# Patient Record
Sex: FEMALE | Race: WHITE | NOT HISPANIC OR LATINO | Employment: STUDENT | URBAN - METROPOLITAN AREA
[De-identification: names, ages, dates, MRNs, and addresses within clinical notes are randomized per-mention and may not be internally consistent; named-entity substitution may affect disease eponyms.]

---

## 2017-03-19 ENCOUNTER — HOSPITAL ENCOUNTER (EMERGENCY)
Facility: HOSPITAL | Age: 22
Discharge: HOME | End: 2017-03-20
Attending: EMERGENCY MEDICINE | Admitting: EMERGENCY MEDICINE

## 2017-03-19 DIAGNOSIS — T80.90XA INJECTION SITE REACTION, INITIAL ENCOUNTER: Primary | ICD-10-CM

## 2017-03-19 PROCEDURE — 99283 EMERGENCY DEPT VISIT LOW MDM: CPT

## 2017-03-20 VITALS
WEIGHT: 190 LBS | BODY MASS INDEX: 32.44 KG/M2 | HEIGHT: 64 IN | TEMPERATURE: 98.7 F | RESPIRATION RATE: 18 BRPM | OXYGEN SATURATION: 99 % | HEART RATE: 89 BPM | SYSTOLIC BLOOD PRESSURE: 127 MMHG | DIASTOLIC BLOOD PRESSURE: 68 MMHG

## 2017-03-20 PROCEDURE — 63710000001 DIPHENHYDRAMINE PER 50 MG: Performed by: EMERGENCY MEDICINE

## 2017-03-20 RX ORDER — DIPHENHYDRAMINE HCL 25 MG
25 CAPSULE ORAL ONCE
Status: COMPLETED | OUTPATIENT
Start: 2017-03-20 | End: 2017-03-20

## 2017-03-20 RX ADMIN — DIPHENHYDRAMINE HYDROCHLORIDE 25 MG: 25 CAPSULE ORAL at 00:30

## 2017-03-20 NOTE — ED PROVIDER NOTES
Subjective   HPI Comments: Mrs. Tinoco is a 21 y.o female who presents to the ED c/o RUE pain starting 6 days ago. Pt reports that she had a Hepatitis B and PNA 23 shot in the right arm 6 days ago. At that time she reports of worsening swelling, itching, and burning. Starting a day later she could not lift that arm and complained of muscle fatigue in that arm. She has tried She denies any fever, or other acute sx, injuries, or pain at this time.       Back normal     Patient is a 21 y.o. female presenting with upper extremity pain.   History provided by:  Patient  Upper Extremity Issue   Location:  Arm  Arm location:  R arm  Injury: no    Pain details:     Onset quality:  Sudden    Duration:  6 days    Timing:  Constant    Progression:  Worsening  Dislocation: no    Foreign body present:  No foreign bodies  Tetanus status:  Unknown  Prior injury to area:  Unable to specify  Relieved by:  None tried  Worsened by:  Nothing  Ineffective treatments:  None tried  Associated symptoms: decreased range of motion, fatigue, muscle weakness and swelling    Associated symptoms: no back pain and no neck pain        Review of Systems   Constitutional: Positive for fatigue.   HENT: Negative.    Respiratory: Negative.    Cardiovascular: Negative for chest pain.   Gastrointestinal: Negative for abdominal pain.   Musculoskeletal: Positive for joint swelling. Negative for back pain and neck pain.   Skin: Positive for rash.   All other systems reviewed and are negative.      Past Medical History   Diagnosis Date   • Muckle-Wells syndrome        No Known Allergies    Past Surgical History   Procedure Laterality Date   • Tonsillectomy     • Sinus surgery     • Cholecystectomy     • Adenoidectomy         History reviewed. No pertinent family history.    Social History     Social History   • Marital status: Single     Spouse name: N/A   • Number of children: N/A   • Years of education: N/A     Social History Main Topics   • Smoking status:  Never Smoker   • Smokeless tobacco: None   • Alcohol use No   • Drug use: No   • Sexual activity: Not Asked     Other Topics Concern   • None     Social History Narrative         Objective   Physical Exam   Constitutional: She is oriented to person, place, and time. She appears well-developed and well-nourished. No distress.   HENT:   Head: Normocephalic and atraumatic.   Eyes: Conjunctivae are normal.   Neck: Normal range of motion. Neck supple.   Cardiovascular: Intact distal pulses.    Pulses:       Radial pulses are 2+ on the right side, and 2+ on the left side.   Pulmonary/Chest: Effort normal and breath sounds normal. No respiratory distress.   Abdominal: Soft. There is no tenderness.   Musculoskeletal: Normal range of motion.   Neurological: She is alert and oriented to person, place, and time. She has normal strength.   Skin: Skin is warm and dry. Rash noted.   Erythematous patch on right upper extremity on the lateral aspect measuring 15 cm vertically by 7-10 cm horizontal and irregular. Warm to touch, no lymphangitic streaking. No axillary lymphadenotomy.   Psychiatric: She has a normal mood and affect. Her behavior is normal.   Nursing note and vitals reviewed.      Procedures         ED Course  ED Course   Comment By Time   Spoke with Will, pharmacist, who reports that when two immunizations given in the same arm these reactions can occur and persist for a week to ten days. Recommends management with ibuprofen. Neither is a live vaccine. -MS Josr Kruger 03/19 2310     No results found for this or any previous visit (from the past 24 hour(s)).  Note: In addition to lab results from this visit, the labs listed above may include labs taken at another facility or during a different encounter within the last 24 hours. Please correlate lab times with ED admission and discharge times for further clarification of the services performed during this visit.    No orders to display     Vitals:    03/19/17 2211  "03/20/17 0040   BP: 128/70 127/68   BP Location: Left arm    Patient Position: Sitting    Pulse: 112 89   Resp: 16 18   Temp: 98.7 °F (37.1 °C)    TempSrc: Oral    SpO2: 98% 99%   Weight: 190 lb (86.2 kg)    Height: 64\" (162.6 cm)      Medications   diphenhydrAMINE (BENADRYL) capsule 25 mg (25 mg Oral Given 3/20/17 0030)     ECG/EMG Results (last 24 hours)     ** No results found for the last 24 hours. **                          MDM  Number of Diagnoses or Management Options  Injection site reaction, initial encounter:   Diagnosis management comments: Consider med rxn, allergic rxn, infxn.        Final diagnoses:   Injection site reaction, initial encounter       Documentation assistance provided by analilia PATRICK.  Information recorded by the scribe was done at my direction and has been verified and validated by me.     Josr Patrick  03/19/17 [id number]       Jet Fried MD  03/20/17 8607    "

## 2017-03-20 NOTE — DISCHARGE INSTRUCTIONS
Take benadryl and ibuprofen as needed for pain, swelling.     Follow up with your primary medical doctor next available appointment.     Immediately return to the emergency department for any new or worsening symptoms, or if your redness spreads significantly.    In future request that immunizations be given at different sites.    Our pharmacist suggest that the reaction should begin resolving in the next few days.

## 2017-10-25 ENCOUNTER — LAB REQUISITION (OUTPATIENT)
Dept: LAB | Facility: HOSPITAL | Age: 22
End: 2017-10-25

## 2017-10-25 DIAGNOSIS — Z00.00 ROUTINE GENERAL MEDICAL EXAMINATION AT A HEALTH CARE FACILITY: ICD-10-CM

## 2017-10-25 PROCEDURE — 36415 COLL VENOUS BLD VENIPUNCTURE: CPT | Performed by: PHYSICIAN ASSISTANT

## 2017-12-11 ENCOUNTER — LAB REQUISITION (OUTPATIENT)
Dept: LAB | Facility: HOSPITAL | Age: 22
End: 2017-12-11

## 2017-12-11 DIAGNOSIS — Z00.00 ROUTINE GENERAL MEDICAL EXAMINATION AT A HEALTH CARE FACILITY: ICD-10-CM

## 2017-12-11 PROCEDURE — 36415 COLL VENOUS BLD VENIPUNCTURE: CPT

## 2018-06-20 ENCOUNTER — LAB REQUISITION (OUTPATIENT)
Dept: LAB | Facility: HOSPITAL | Age: 23
End: 2018-06-20

## 2018-06-20 DIAGNOSIS — Z00.00 ROUTINE GENERAL MEDICAL EXAMINATION AT A HEALTH CARE FACILITY: ICD-10-CM

## 2018-06-20 PROCEDURE — 36415 COLL VENOUS BLD VENIPUNCTURE: CPT

## 2019-07-05 ENCOUNTER — LAB REQUISITION (OUTPATIENT)
Dept: LAB | Facility: HOSPITAL | Age: 24
End: 2019-07-05

## 2019-07-05 DIAGNOSIS — E78.5 HYPERLIPIDEMIA: ICD-10-CM

## 2019-07-05 DIAGNOSIS — R53.82 CHRONIC FATIGUE: ICD-10-CM

## 2019-07-05 DIAGNOSIS — M04.1 PERIODIC FEVER (HCC): ICD-10-CM

## 2019-07-05 LAB
25(OH)D3 SERPL-MCNC: 24.9 NG/ML (ref 30–100)
ALBUMIN SERPL-MCNC: 4.4 G/DL (ref 3.5–5.2)
ALBUMIN SERPL-MCNC: 4.4 G/DL (ref 3.5–5.2)
ALBUMIN/GLOB SERPL: 1.6 G/DL
ALBUMIN/GLOB SERPL: 1.7 G/DL
ALP SERPL-CCNC: 73 U/L (ref 39–117)
ALP SERPL-CCNC: 73 U/L (ref 39–117)
ALT SERPL W P-5'-P-CCNC: 28 U/L (ref 1–33)
ALT SERPL W P-5'-P-CCNC: 28 U/L (ref 1–33)
ANION GAP SERPL CALCULATED.3IONS-SCNC: 13 MMOL/L (ref 5–15)
ANION GAP SERPL CALCULATED.3IONS-SCNC: 14 MMOL/L (ref 5–15)
AST SERPL-CCNC: 29 U/L (ref 1–32)
AST SERPL-CCNC: 31 U/L (ref 1–32)
BASOPHILS # BLD AUTO: 0.03 10*3/MM3 (ref 0–0.2)
BASOPHILS NFR BLD AUTO: 0.8 % (ref 0–1.5)
BILIRUB SERPL-MCNC: 0.5 MG/DL (ref 0.2–1.2)
BILIRUB SERPL-MCNC: 0.5 MG/DL (ref 0.2–1.2)
BUN BLD-MCNC: 8 MG/DL (ref 6–20)
BUN BLD-MCNC: 8 MG/DL (ref 6–20)
BUN/CREAT SERPL: 13.3 (ref 7–25)
BUN/CREAT SERPL: 14.3 (ref 7–25)
CALCIUM SPEC-SCNC: 9.2 MG/DL (ref 8.6–10.5)
CALCIUM SPEC-SCNC: 9.3 MG/DL (ref 8.6–10.5)
CHLORIDE SERPL-SCNC: 102 MMOL/L (ref 98–107)
CHLORIDE SERPL-SCNC: 103 MMOL/L (ref 98–107)
CO2 SERPL-SCNC: 23 MMOL/L (ref 22–29)
CO2 SERPL-SCNC: 23 MMOL/L (ref 22–29)
CREAT BLD-MCNC: 0.56 MG/DL (ref 0.57–1)
CREAT BLD-MCNC: 0.6 MG/DL (ref 0.57–1)
CRP SERPL-MCNC: 0.45 MG/DL (ref 0–0.5)
DEPRECATED RDW RBC AUTO: 37 FL (ref 37–54)
DEPRECATED RDW RBC AUTO: 39.8 FL (ref 37–54)
EOSINOPHIL # BLD AUTO: 0.06 10*3/MM3 (ref 0–0.4)
EOSINOPHIL NFR BLD AUTO: 1.6 % (ref 0.3–6.2)
ERYTHROCYTE [DISTWIDTH] IN BLOOD BY AUTOMATED COUNT: 11.8 % (ref 12.3–15.4)
ERYTHROCYTE [DISTWIDTH] IN BLOOD BY AUTOMATED COUNT: 12.3 % (ref 12.3–15.4)
ERYTHROCYTE [SEDIMENTATION RATE] IN BLOOD: 11 MM/HR (ref 0–20)
FERRITIN SERPL-MCNC: 144 NG/ML (ref 13–150)
GFR SERPL CREATININE-BSD FRML MDRD: 124 ML/MIN/1.73
GFR SERPL CREATININE-BSD FRML MDRD: 134 ML/MIN/1.73
GLOBULIN UR ELPH-MCNC: 2.6 GM/DL
GLOBULIN UR ELPH-MCNC: 2.7 GM/DL
GLUCOSE BLD-MCNC: 105 MG/DL (ref 65–99)
GLUCOSE BLD-MCNC: 107 MG/DL (ref 65–99)
HCT VFR BLD AUTO: 41.1 % (ref 34–46.6)
HCT VFR BLD AUTO: 41.6 % (ref 34–46.6)
HGB BLD-MCNC: 13.6 G/DL (ref 12–15.9)
HGB BLD-MCNC: 13.8 G/DL (ref 12–15.9)
IMM GRANULOCYTES # BLD AUTO: 0 10*3/MM3 (ref 0–0.05)
IMM GRANULOCYTES NFR BLD AUTO: 0 % (ref 0–0.5)
LYMPHOCYTES # BLD AUTO: 1.15 10*3/MM3 (ref 0.7–3.1)
LYMPHOCYTES NFR BLD AUTO: 30.8 % (ref 19.6–45.3)
MCH RBC QN AUTO: 28.6 PG (ref 26.6–33)
MCH RBC QN AUTO: 29.3 PG (ref 26.6–33)
MCHC RBC AUTO-ENTMCNC: 33.1 G/DL (ref 31.5–35.7)
MCHC RBC AUTO-ENTMCNC: 33.2 G/DL (ref 31.5–35.7)
MCV RBC AUTO: 86.3 FL (ref 79–97)
MCV RBC AUTO: 88.6 FL (ref 79–97)
MONOCYTES # BLD AUTO: 0.39 10*3/MM3 (ref 0.1–0.9)
MONOCYTES NFR BLD AUTO: 10.5 % (ref 5–12)
NEUTROPHILS # BLD AUTO: 2.1 10*3/MM3 (ref 1.7–7)
NEUTROPHILS NFR BLD AUTO: 56.3 % (ref 42.7–76)
NRBC BLD AUTO-RTO: 0 /100 WBC (ref 0–0.2)
PLATELET # BLD AUTO: 209 10*3/MM3 (ref 140–450)
PLATELET # BLD AUTO: 216 10*3/MM3 (ref 140–450)
PMV BLD AUTO: 11.9 FL (ref 6–12)
PMV BLD AUTO: 12 FL (ref 6–12)
POTASSIUM BLD-SCNC: 3.9 MMOL/L (ref 3.5–5.2)
POTASSIUM BLD-SCNC: 4 MMOL/L (ref 3.5–5.2)
PROT SERPL-MCNC: 7 G/DL (ref 6–8.5)
PROT SERPL-MCNC: 7.1 G/DL (ref 6–8.5)
RBC # BLD AUTO: 4.64 10*6/MM3 (ref 3.77–5.28)
RBC # BLD AUTO: 4.82 10*6/MM3 (ref 3.77–5.28)
SODIUM BLD-SCNC: 139 MMOL/L (ref 136–145)
SODIUM BLD-SCNC: 139 MMOL/L (ref 136–145)
TSH SERPL DL<=0.05 MIU/L-ACNC: 2.62 MIU/ML (ref 0.27–4.2)
VIT B12 BLD-MCNC: 431 PG/ML (ref 211–946)
WBC NRBC COR # BLD: 3.63 10*3/MM3 (ref 3.4–10.8)
WBC NRBC COR # BLD: 3.73 10*3/MM3 (ref 3.4–10.8)

## 2019-07-05 PROCEDURE — 80053 COMPREHEN METABOLIC PANEL: CPT | Performed by: PEDIATRICS

## 2019-07-05 PROCEDURE — 82607 VITAMIN B-12: CPT | Performed by: NURSE PRACTITIONER

## 2019-07-05 PROCEDURE — 36415 COLL VENOUS BLD VENIPUNCTURE: CPT | Performed by: PEDIATRICS

## 2019-07-05 PROCEDURE — 82728 ASSAY OF FERRITIN: CPT | Performed by: PEDIATRICS

## 2019-07-05 PROCEDURE — 85025 COMPLETE CBC W/AUTO DIFF WBC: CPT | Performed by: PEDIATRICS

## 2019-07-05 PROCEDURE — 86140 C-REACTIVE PROTEIN: CPT | Performed by: PEDIATRICS

## 2019-07-05 PROCEDURE — 85652 RBC SED RATE AUTOMATED: CPT | Performed by: PEDIATRICS

## 2019-07-05 PROCEDURE — 82306 VITAMIN D 25 HYDROXY: CPT | Performed by: NURSE PRACTITIONER

## 2019-07-05 PROCEDURE — 85027 COMPLETE CBC AUTOMATED: CPT | Performed by: NURSE PRACTITIONER

## 2019-07-05 PROCEDURE — 80053 COMPREHEN METABOLIC PANEL: CPT | Performed by: NURSE PRACTITIONER

## 2019-07-05 PROCEDURE — 84443 ASSAY THYROID STIM HORMONE: CPT | Performed by: NURSE PRACTITIONER

## 2019-09-08 ENCOUNTER — APPOINTMENT (OUTPATIENT)
Dept: CT IMAGING | Facility: HOSPITAL | Age: 24
End: 2019-09-08

## 2019-09-08 ENCOUNTER — HOSPITAL ENCOUNTER (INPATIENT)
Facility: HOSPITAL | Age: 24
LOS: 2 days | Discharge: HOME | End: 2019-09-10
Attending: EMERGENCY MEDICINE | Admitting: FAMILY MEDICINE

## 2019-09-08 DIAGNOSIS — N13.30 HYDRONEPHROSIS OF LEFT KIDNEY: ICD-10-CM

## 2019-09-08 DIAGNOSIS — R03.0 ELEVATED BLOOD-PRESSURE READING WITHOUT DIAGNOSIS OF HYPERTENSION: ICD-10-CM

## 2019-09-08 DIAGNOSIS — R11.2 INTRACTABLE VOMITING WITH NAUSEA, UNSPECIFIED VOMITING TYPE: ICD-10-CM

## 2019-09-08 DIAGNOSIS — N20.1 LEFT URETERAL STONE: ICD-10-CM

## 2019-09-08 DIAGNOSIS — R52 INTRACTABLE PAIN: Primary | ICD-10-CM

## 2019-09-08 PROBLEM — N20.0 KIDNEY STONES: Status: ACTIVE | Noted: 2019-09-08

## 2019-09-08 LAB
ALBUMIN SERPL-MCNC: 4.2 G/DL (ref 3.5–5.2)
ALBUMIN/GLOB SERPL: 1.4 G/DL
ALP SERPL-CCNC: 78 U/L (ref 39–117)
ALT SERPL W P-5'-P-CCNC: 31 U/L (ref 1–33)
ANION GAP SERPL CALCULATED.3IONS-SCNC: 14 MMOL/L (ref 5–15)
AST SERPL-CCNC: 31 U/L (ref 1–32)
B-HCG UR QL: NEGATIVE
BACTERIA UR QL AUTO: ABNORMAL /HPF
BASOPHILS # BLD AUTO: 0.03 10*3/MM3 (ref 0–0.2)
BASOPHILS NFR BLD AUTO: 0.4 % (ref 0–1.5)
BILIRUB SERPL-MCNC: 0.3 MG/DL (ref 0.2–1.2)
BILIRUB UR QL STRIP: NEGATIVE
BUN BLD-MCNC: 15 MG/DL (ref 6–20)
BUN/CREAT SERPL: 20.5 (ref 7–25)
CALCIUM SPEC-SCNC: 9.1 MG/DL (ref 8.6–10.5)
CHLORIDE SERPL-SCNC: 101 MMOL/L (ref 98–107)
CLARITY UR: CLEAR
CO2 SERPL-SCNC: 24 MMOL/L (ref 22–29)
COLOR UR: YELLOW
CREAT BLD-MCNC: 0.73 MG/DL (ref 0.57–1)
DEPRECATED RDW RBC AUTO: 37.9 FL (ref 37–54)
EOSINOPHIL # BLD AUTO: 0.03 10*3/MM3 (ref 0–0.4)
EOSINOPHIL NFR BLD AUTO: 0.4 % (ref 0.3–6.2)
ERYTHROCYTE [DISTWIDTH] IN BLOOD BY AUTOMATED COUNT: 11.9 % (ref 12.3–15.4)
GFR SERPL CREATININE-BSD FRML MDRD: 98 ML/MIN/1.73
GLOBULIN UR ELPH-MCNC: 3 GM/DL
GLUCOSE BLD-MCNC: 128 MG/DL (ref 65–99)
GLUCOSE UR STRIP-MCNC: NEGATIVE MG/DL
HCT VFR BLD AUTO: 41.7 % (ref 34–46.6)
HGB BLD-MCNC: 13.9 G/DL (ref 12–15.9)
HGB UR QL STRIP.AUTO: ABNORMAL
IMM GRANULOCYTES # BLD AUTO: 0.02 10*3/MM3 (ref 0–0.05)
IMM GRANULOCYTES NFR BLD AUTO: 0.3 % (ref 0–0.5)
INTERNAL NEGATIVE CONTROL: NEGATIVE
INTERNAL POSITIVE CONTROL: POSITIVE
KETONES UR QL STRIP: NEGATIVE
LEUKOCYTE ESTERASE UR QL STRIP.AUTO: NEGATIVE
LYMPHOCYTES # BLD AUTO: 1.17 10*3/MM3 (ref 0.7–3.1)
LYMPHOCYTES NFR BLD AUTO: 15.7 % (ref 19.6–45.3)
Lab: NORMAL
MCH RBC QN AUTO: 29 PG (ref 26.6–33)
MCHC RBC AUTO-ENTMCNC: 33.3 G/DL (ref 31.5–35.7)
MCV RBC AUTO: 87.1 FL (ref 79–97)
MONOCYTES # BLD AUTO: 0.5 10*3/MM3 (ref 0.1–0.9)
MONOCYTES NFR BLD AUTO: 6.7 % (ref 5–12)
NEUTROPHILS # BLD AUTO: 5.72 10*3/MM3 (ref 1.7–7)
NEUTROPHILS NFR BLD AUTO: 76.5 % (ref 42.7–76)
NITRITE UR QL STRIP: NEGATIVE
NRBC BLD AUTO-RTO: 0 /100 WBC (ref 0–0.2)
PH UR STRIP.AUTO: 5.5 [PH] (ref 5–8)
PLATELET # BLD AUTO: 224 10*3/MM3 (ref 140–450)
PMV BLD AUTO: 10.9 FL (ref 6–12)
POTASSIUM BLD-SCNC: 4.3 MMOL/L (ref 3.5–5.2)
PROT SERPL-MCNC: 7.2 G/DL (ref 6–8.5)
PROT UR QL STRIP: NEGATIVE
RBC # BLD AUTO: 4.79 10*6/MM3 (ref 3.77–5.28)
RBC # UR: ABNORMAL /HPF
REF LAB TEST METHOD: ABNORMAL
SODIUM BLD-SCNC: 139 MMOL/L (ref 136–145)
SP GR UR STRIP: 1.02 (ref 1–1.03)
SQUAMOUS #/AREA URNS HPF: ABNORMAL /HPF
UROBILINOGEN UR QL STRIP: ABNORMAL
WBC NRBC COR # BLD: 7.47 10*3/MM3 (ref 3.4–10.8)
WBC UR QL AUTO: ABNORMAL /HPF

## 2019-09-08 PROCEDURE — 99285 EMERGENCY DEPT VISIT HI MDM: CPT

## 2019-09-08 PROCEDURE — 74176 CT ABD & PELVIS W/O CONTRAST: CPT

## 2019-09-08 PROCEDURE — 85025 COMPLETE CBC W/AUTO DIFF WBC: CPT | Performed by: EMERGENCY MEDICINE

## 2019-09-08 PROCEDURE — 25010000002 KETOROLAC TROMETHAMINE PER 15 MG: Performed by: EMERGENCY MEDICINE

## 2019-09-08 PROCEDURE — 81025 URINE PREGNANCY TEST: CPT | Performed by: EMERGENCY MEDICINE

## 2019-09-08 PROCEDURE — 25010000002 HYDROMORPHONE PER 4 MG: Performed by: EMERGENCY MEDICINE

## 2019-09-08 PROCEDURE — 25010000002 HYDROMORPHONE PER 4 MG: Performed by: FAMILY MEDICINE

## 2019-09-08 PROCEDURE — 81001 URINALYSIS AUTO W/SCOPE: CPT | Performed by: EMERGENCY MEDICINE

## 2019-09-08 PROCEDURE — 25010000002 PROMETHAZINE PER 50 MG: Performed by: EMERGENCY MEDICINE

## 2019-09-08 PROCEDURE — 25010000002 CEFTRIAXONE PER 250 MG: Performed by: INTERNAL MEDICINE

## 2019-09-08 PROCEDURE — 80053 COMPREHEN METABOLIC PANEL: CPT | Performed by: EMERGENCY MEDICINE

## 2019-09-08 PROCEDURE — 99223 1ST HOSP IP/OBS HIGH 75: CPT | Performed by: FAMILY MEDICINE

## 2019-09-08 PROCEDURE — 25010000002 ONDANSETRON PER 1 MG: Performed by: EMERGENCY MEDICINE

## 2019-09-08 PROCEDURE — G0378 HOSPITAL OBSERVATION PER HR: HCPCS

## 2019-09-08 PROCEDURE — 25010000002 CEFTRIAXONE PER 250 MG: Performed by: FAMILY MEDICINE

## 2019-09-08 RX ORDER — HYDROCODONE BITARTRATE AND ACETAMINOPHEN 5; 325 MG/1; MG/1
1 TABLET ORAL EVERY 4 HOURS PRN
Status: DISCONTINUED | OUTPATIENT
Start: 2019-09-08 | End: 2019-09-10 | Stop reason: HOSPADM

## 2019-09-08 RX ORDER — NALOXONE HCL 0.4 MG/ML
0.4 VIAL (ML) INJECTION
Status: DISCONTINUED | OUTPATIENT
Start: 2019-09-08 | End: 2019-09-10 | Stop reason: HOSPADM

## 2019-09-08 RX ORDER — LEVONORGESTREL AND ETHINYL ESTRADIOL 0.15-0.03
1 KIT ORAL DAILY
Status: DISCONTINUED | OUTPATIENT
Start: 2019-09-08 | End: 2019-09-09

## 2019-09-08 RX ORDER — HYDROMORPHONE HYDROCHLORIDE 1 MG/ML
0.5 INJECTION, SOLUTION INTRAMUSCULAR; INTRAVENOUS; SUBCUTANEOUS ONCE
Status: COMPLETED | OUTPATIENT
Start: 2019-09-08 | End: 2019-09-08

## 2019-09-08 RX ORDER — HYDROMORPHONE HYDROCHLORIDE 1 MG/ML
0.5 INJECTION, SOLUTION INTRAMUSCULAR; INTRAVENOUS; SUBCUTANEOUS
Status: DISCONTINUED | OUTPATIENT
Start: 2019-09-08 | End: 2019-09-10 | Stop reason: HOSPADM

## 2019-09-08 RX ORDER — KETOROLAC TROMETHAMINE 15 MG/ML
15 INJECTION, SOLUTION INTRAMUSCULAR; INTRAVENOUS ONCE
Status: COMPLETED | OUTPATIENT
Start: 2019-09-08 | End: 2019-09-08

## 2019-09-08 RX ORDER — TAMSULOSIN HYDROCHLORIDE 0.4 MG/1
0.4 CAPSULE ORAL ONCE
Status: COMPLETED | OUTPATIENT
Start: 2019-09-08 | End: 2019-09-08

## 2019-09-08 RX ORDER — HYDROMORPHONE HYDROCHLORIDE 2 MG/1
2 TABLET ORAL EVERY 4 HOURS PRN
Qty: 12 TABLET | Refills: 0 | Status: SHIPPED | OUTPATIENT
Start: 2019-09-08 | End: 2019-09-10 | Stop reason: HOSPADM

## 2019-09-08 RX ORDER — ONDANSETRON 2 MG/ML
4 INJECTION INTRAMUSCULAR; INTRAVENOUS ONCE
Status: COMPLETED | OUTPATIENT
Start: 2019-09-08 | End: 2019-09-08

## 2019-09-08 RX ORDER — TAMSULOSIN HYDROCHLORIDE 0.4 MG/1
1 CAPSULE ORAL NIGHTLY
Qty: 30 CAPSULE | Refills: 0 | Status: SHIPPED | OUTPATIENT
Start: 2019-09-08 | End: 2019-09-10 | Stop reason: SDUPTHER

## 2019-09-08 RX ORDER — SODIUM CHLORIDE 0.9 % (FLUSH) 0.9 %
10 SYRINGE (ML) INJECTION AS NEEDED
Status: DISCONTINUED | OUTPATIENT
Start: 2019-09-08 | End: 2019-09-10 | Stop reason: HOSPADM

## 2019-09-08 RX ORDER — KETOROLAC TROMETHAMINE 10 MG/1
10 TABLET, FILM COATED ORAL EVERY 6 HOURS PRN
Status: ON HOLD | COMMUNITY
End: 2019-09-08

## 2019-09-08 RX ORDER — ONDANSETRON 2 MG/ML
4 INJECTION INTRAMUSCULAR; INTRAVENOUS EVERY 6 HOURS PRN
Status: DISCONTINUED | OUTPATIENT
Start: 2019-09-08 | End: 2019-09-10 | Stop reason: HOSPADM

## 2019-09-08 RX ORDER — ACETAMINOPHEN 325 MG/1
650 TABLET ORAL EVERY 4 HOURS PRN
Status: DISCONTINUED | OUTPATIENT
Start: 2019-09-08 | End: 2019-09-10 | Stop reason: HOSPADM

## 2019-09-08 RX ORDER — SODIUM CHLORIDE 9 MG/ML
125 INJECTION, SOLUTION INTRAVENOUS CONTINUOUS
Status: DISCONTINUED | OUTPATIENT
Start: 2019-09-08 | End: 2019-09-10

## 2019-09-08 RX ORDER — SODIUM CHLORIDE 0.9 % (FLUSH) 0.9 %
10 SYRINGE (ML) INJECTION EVERY 12 HOURS SCHEDULED
Status: DISCONTINUED | OUTPATIENT
Start: 2019-09-08 | End: 2019-09-10 | Stop reason: HOSPADM

## 2019-09-08 RX ORDER — FAMOTIDINE 20 MG/1
40 TABLET, FILM COATED ORAL DAILY
Status: DISCONTINUED | OUTPATIENT
Start: 2019-09-08 | End: 2019-09-10 | Stop reason: HOSPADM

## 2019-09-08 RX ORDER — ONDANSETRON 4 MG/1
4 TABLET, FILM COATED ORAL EVERY 6 HOURS PRN
Status: DISCONTINUED | OUTPATIENT
Start: 2019-09-08 | End: 2019-09-10 | Stop reason: HOSPADM

## 2019-09-08 RX ORDER — ACETAMINOPHEN 160 MG/5ML
650 SOLUTION ORAL EVERY 4 HOURS PRN
Status: DISCONTINUED | OUTPATIENT
Start: 2019-09-08 | End: 2019-09-10 | Stop reason: HOSPADM

## 2019-09-08 RX ORDER — ACETAMINOPHEN 650 MG/1
650 SUPPOSITORY RECTAL EVERY 4 HOURS PRN
Status: DISCONTINUED | OUTPATIENT
Start: 2019-09-08 | End: 2019-09-10 | Stop reason: HOSPADM

## 2019-09-08 RX ORDER — HYDROMORPHONE HYDROCHLORIDE 2 MG/1
2 TABLET ORAL ONCE
Status: DISCONTINUED | OUTPATIENT
Start: 2019-09-08 | End: 2019-09-08

## 2019-09-08 RX ORDER — PROMETHAZINE HYDROCHLORIDE 25 MG/ML
6.25 INJECTION, SOLUTION INTRAMUSCULAR; INTRAVENOUS ONCE
Status: DISCONTINUED | OUTPATIENT
Start: 2019-09-08 | End: 2019-09-08

## 2019-09-08 RX ORDER — PROMETHAZINE HYDROCHLORIDE 25 MG/ML
6.25 INJECTION, SOLUTION INTRAMUSCULAR; INTRAVENOUS ONCE
Status: COMPLETED | OUTPATIENT
Start: 2019-09-08 | End: 2019-09-08

## 2019-09-08 RX ADMIN — CEFTRIAXONE 1 G: 1 INJECTION, POWDER, FOR SOLUTION INTRAMUSCULAR; INTRAVENOUS at 21:10

## 2019-09-08 RX ADMIN — KETOROLAC TROMETHAMINE 15 MG: 15 INJECTION, SOLUTION INTRAMUSCULAR; INTRAVENOUS at 11:14

## 2019-09-08 RX ADMIN — HYDROMORPHONE HYDROCHLORIDE 0.5 MG: 1 INJECTION, SOLUTION INTRAMUSCULAR; INTRAVENOUS; SUBCUTANEOUS at 15:06

## 2019-09-08 RX ADMIN — PROMETHAZINE HYDROCHLORIDE 6.25 MG: 25 INJECTION INTRAMUSCULAR; INTRAVENOUS at 11:19

## 2019-09-08 RX ADMIN — HYDROMORPHONE HYDROCHLORIDE 0.5 MG: 1 INJECTION, SOLUTION INTRAMUSCULAR; INTRAVENOUS; SUBCUTANEOUS at 17:58

## 2019-09-08 RX ADMIN — HYDROMORPHONE HYDROCHLORIDE 0.5 MG: 1 INJECTION, SOLUTION INTRAMUSCULAR; INTRAVENOUS; SUBCUTANEOUS at 11:51

## 2019-09-08 RX ADMIN — FAMOTIDINE 40 MG: 20 TABLET ORAL at 21:09

## 2019-09-08 RX ADMIN — ONDANSETRON 4 MG: 2 INJECTION INTRAMUSCULAR; INTRAVENOUS at 08:55

## 2019-09-08 RX ADMIN — TAMSULOSIN HYDROCHLORIDE 0.4 MG: 0.4 CAPSULE ORAL at 11:14

## 2019-09-08 RX ADMIN — SODIUM CHLORIDE 1000 ML: 9 INJECTION, SOLUTION INTRAVENOUS at 08:55

## 2019-09-08 RX ADMIN — HYDROMORPHONE HYDROCHLORIDE 0.5 MG: 1 INJECTION, SOLUTION INTRAMUSCULAR; INTRAVENOUS; SUBCUTANEOUS at 08:54

## 2019-09-08 NOTE — PLAN OF CARE
Problem: Lithotripsy ([facility]) (Adult)  Goal: Signs and Symptoms of Listed Potential Problems Will be Absent, Minimized or Managed (Lithotripsy)  Outcome: Ongoing (interventions implemented as appropriate)

## 2019-09-08 NOTE — H&P
[facility] Medicine Services  HISTORY AND PHYSICAL    Patient Name: Mony Tinoco  : 1995  MRN: [id number]  Primary Care Physician: Provider, No Known  Date of Admission: 2019    Subjective     Chief Complaint:  Left Flank Pain    History of Present Illness: Mony Tinoco is a 24 y.o. female with Muckle-Wells syndrome, PCOS, morbid obesity and HLD who presents to [facility] ED accompanied by her mother Juan secondary to flank pain.  She describes sharp left flank pain over 24 hours not improving with home care.  She reports a crescendo in her pain characterized as >7/10.  She describes associated nausea with emesis and chills.  She denies associated fever, gross hematuria or syncope.  She describes a past history of kidney stones managed by Dr. Abdi.  In the ED imaging identified left hydroureteronephrosis.    Review of Systems   Constitutional: Positive for appetite change, chills and diaphoresis. Negative for fever.   HENT: Negative.  Negative for nosebleeds and trouble swallowing.    Eyes: Negative.    Respiratory: Negative.  Negative for shortness of breath.    Cardiovascular: Negative.  Negative for chest pain.   Gastrointestinal: Positive for nausea and vomiting. Abdominal pain: flank pain    Endocrine: Negative.  Negative for polydipsia, polyphagia and polyuria.   Genitourinary: Positive for urgency.   Musculoskeletal: Negative.    Skin: Negative.    Allergic/Immunologic: Negative.    Neurological: Negative.  Negative for syncope.   Hematological: Negative.    Psychiatric/Behavioral: Negative.    All other systems reviewed and are negative.       Past Medical History:   Diagnosis Date   • Hearing loss    • HLD (hyperlipidemia)    • Morbid obesity (CMS/HCC)    • Muckle-Wells syndrome (CMS/HCC)    • PCOS (polycystic ovarian syndrome)        Past Surgical History:   Procedure Laterality Date   • APPENDECTOMY     • CHOLECYSTECTOMY     • SINUS SURGERY     • TONSILLECTOMY  "AND ADENOIDECTOMY  1999       Family History   Problem Relation Age of Onset   • No Known Problems Mother    • Kidney nephrosis Father        Social History     Socioeconomic History   • Marital status: Single     Spouse name: N/A   • Number of children: 0   • Years of education: H.S.   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      Allergies:    Allergies   Allergen Reactions   • Dramamine [Dimenhydrinate] Angioedema       Objective     Vital Signs: /84 (BP Location: Right arm, Patient Position: Lying)   Pulse 79   Temp 97.7 °F (36.5 °C) (Oral)   Resp 16   Ht 162.6 cm (64\")   Wt 99.8 kg (220 lb)   LMP 09/06/2019   SpO2 100%   BMI 37.76 kg/m²   Body mass index is 37.76 kg/m².    Physical Exam   Constitutional: She is oriented to person, place, and time. She appears well-developed and well-nourished. She is cooperative.   HENT:   Head: Normocephalic and atraumatic.   Right Ear: Hearing and external ear normal.   Left Ear: Hearing and external ear normal.   Nose: Nose normal.   Mouth/Throat: Uvula is midline, oropharynx is clear and moist and mucous membranes are normal.   Eyes: Conjunctivae and EOM are normal. Pupils are equal, round, and reactive to light. No scleral icterus.   Neck: Trachea normal and normal range of motion. Neck supple. No JVD present. Carotid bruit is not present. No thyromegaly present.   Cardiovascular: Normal rate, regular rhythm, normal heart sounds and intact distal pulses.   Pulmonary/Chest: Effort normal and breath sounds normal.   Abdominal: Soft. Bowel sounds are normal. There is no hepatosplenomegaly. There is " CVA tenderness.   Musculoskeletal: Normal range of motion.   Lymphadenopathy:     She has no cervical adenopathy.   Neurological: She is alert and oriented to person, place, and time. She has normal strength and normal reflexes. No sensory deficit. Gait normal.   Skin: Skin is warm and dry.   Psychiatric: She has a normal mood and affect. Her speech is normal and behavior is normal. Judgment and thought content normal. Cognition and memory are normal.   Nursing note and vitals reviewed.    Results Reviewed:   I have personally reviewed and interpreted available lab data dated 09/08/19 as below.  Lab Results (most recent)     Procedure Component Value Units Date/Time    Urinalysis With Microscopic If Indicated (No Culture) - Urine, Clean Catch [[id number]]  (Abnormal) Collected:  09/08/19 0843    Specimen:  Urine, Clean Catch Updated:  09/08/19 0932     Color, UA Yellow     Appearance, UA Clear     pH, UA 5.5     Specific Gravity, UA 1.021     Glucose, UA Negative     Ketones, UA Negative     Bilirubin, UA Negative     Blood, UA Trace     Protein, UA Negative     Leuk Esterase, UA Negative     Nitrite, UA Negative     Urobilinogen, UA 0.2 E.U./dL    Urinalysis, Microscopic Only - Urine, Clean Catch [[id number]]  (Abnormal) Collected:  09/08/19 0843    Specimen:  Urine, Clean Catch Updated:  09/08/19 0932     RBC, UA 0-2 /HPF      WBC, UA 0-2 /HPF      Bacteria, UA 1+ /HPF      Squamous Epithelial Cells, UA 21-30 /HPF      Methodology Manual Light Microscopy    Comprehensive Metabolic Panel [[id number]]  (Abnormal) Collected:  09/08/19 0852    Specimen:  Blood Updated:  09/08/19 0918     Glucose 128 mg/dL      BUN 15 mg/dL      Creatinine 0.73 mg/dL      Sodium 139 mmol/L      Potassium 4.3 mmol/L      Chloride 101 mmol/L      CO2 24.0 mmol/L      Calcium 9.1 mg/dL      Total Protein 7.2 g/dL      Albumin 4.20 g/dL      ALT (SGPT) 31 U/L      AST (SGOT) 31 U/L      Alkaline Phosphatase 78 U/L      Total Bilirubin 0.3  mg/dL      eGFR Non African Amer 98 mL/min/1.73      Globulin 3.0 gm/dL      A/G Ratio 1.4 g/dL      BUN/Creatinine Ratio 20.5     Anion Gap 14.0 mmol/L     CBC Auto Differential [[id number]]  (Abnormal) Collected:  09/08/19 0852    Specimen:  Blood Updated:  09/08/19 0909     WBC 7.47 10*3/mm3      RBC 4.79 10*6/mm3      Hemoglobin 13.9 g/dL      Hematocrit 41.7 %      MCV 87.1 fL      MCH 29.0 pg      MCHC 33.3 g/dL      RDW 11.9 %      RDW-SD 37.9 fl      MPV 10.9 fL      Platelets 224 10*3/mm3      Neutrophil % 76.5 %      Lymphocyte % 15.7 %      Monocyte % 6.7 %      Eosinophil % 0.4 %      Basophil % 0.4 %      Immature Grans % 0.3 %      Neutrophils, Absolute 5.72 10*3/mm3      Lymphocytes, Absolute 1.17 10*3/mm3      Monocytes, Absolute 0.50 10*3/mm3      Eosinophils, Absolute 0.03 10*3/mm3      Basophils, Absolute 0.03 10*3/mm3      Immature Grans, Absolute 0.02 10*3/mm3      nRBC 0.0 /100 WBC     POCT Pregnancy, Urine [[id number]]  (Normal) Collected:  09/08/19 0857    Specimen:  Urine Updated:  09/08/19 0857     HCG, Urine, QL Negative     Lot Number [id number]     Internal Positive Control Positive     Internal Negative Control Negative        I have personally reviewed imaging reports available and dated 09/08/19 as below.  CT Abdomen Pelvis Without Contrast  Narrative: EXAMINATION: CT ABDOMEN PELVIS WO CONTRAST-      INDICATION: right flank pain; N20.1-Calculus of ureter; R03.0-Elevated  blood-pressure reading, without diagnosis of hypertension     TECHNIQUE: CT abdomen and pelvis without intravenous contrast was  performed. Additional coronal and sagittal reformatted imaging was  provided for review.     The radiation dose reduction device was turned on for each scan per the  ALARA (As Low as Reasonably Achievable) protocol.     COMPARISON: CT abdomen and pelvis 10/30/2016     FINDINGS: Dedicated CT imaging the lower chest demonstrates no acute  cardiomegaly process. The liver is unremarkable in  appearance. Post  cholecystectomy changes identified. The spleen, adrenal glands, and  pancreas do not demonstrate abnormality.     There is edema and enlargement of the left kidney with surrounding left  perinephric inflammation with mild left hydroureteronephrosis with a  obstructing distal punctate 2 mm left ureteral calculus near the  ureterovesicular junction. Urinary bladder is decompressed and otherwise  unremarkable. No evidence of right-sided hydronephrosis or obstructive  uropathy. Nonobstructing 3 mm right renal calculus is identified. No  perinephric fluid collection identified.     The uterus is unremarkable. Cystic changes are suggested involving the  bilateral ovaries without obvious abnormality. The stomach is  unremarkable in appearance. The small bowel is nondilated. No evidence  of bowel obstruction appreciated. The appendix is not definitively  identified however no right lower quadrant inflammation is appreciated.  Colon does not demonstrate obvious abnormality. The descending colon is  decompressed and otherwise unremarkable. No free air or free fluid is  identified. Atherosclerotic soft tissues do not demonstrate abnormality.  The bony pelvis appears intact.     Impression:    Obstructing distal left 2 mm calculus at the ureterovesicular junction  with resultant mild hydroureteronephrosis and extensive inflammation of  the left kidney.     Punctate nonobstructing right renal calculus.            Assessment / Plan     Assessment/Problem List:     Hydroureteronephrosis    Kidney stones    Intractable pain    Morbid obesity (CMS/HCC)    Muckle-Wells syndrome (CMS/HCC)    PCOS (polycystic ovarian syndrome)      Plan:  Admit to Med/Surg  Consult Urology  Oxygen therapy  Pulse oximetry monitoring  Routine blood pressure monitoring  Accurate I/O's  IV fluids @ 125 cc/hr  Diet: NPO pc MN  Activity: Up ad layton  IV Antibiotics: Rocephin  Anti-emetic therapy  Imaging: CT in ED  H2 blocker  therapy  Dilaudid therapy for uncontrolled pain  AM labs BMP, CBC, A1c    With her acute renal findings on imaging, parentally administered controlled substance for comfort care and anticipated surgical procedure, we will admit as inpatient.  I believe this patient meets INPATIENT status due to the need for care which can only be reasonably provided in an hospital setting such as aggressive/expedited ancillary services and/or consultation services and the necessity for IV medications, close physician monitoring and/or the possible need for procedures.  In such, I feel patient’s risk for adverse outcomes and need for care warrant INPATIENT evaluation and predict the patient’s care encounter to likely last beyond 2 midnights.    DVT prophylaxis: Mechanical  Code Status: CPR  Admission Status: Patient will be admitted as LEXINPT.     Electronically signed by Arturo Will MD, 09/08/19, 11:11 PM

## 2019-09-08 NOTE — ED PROVIDER NOTES
Subjective   Ms. Mony Tinoco is a 24 y.o. female who presents to the ED with c/o flank pain. She reports she has had a known kidney stone in her left kidney but yesterday she experienced onset of pain in her left kidney. She also complains of nausea, vomiting, and chills. She was seen at [facility] and prescribed Toradol but this was ineffective. The pain returned at 0600 this morning. She is followed by Dr. Abdi, urologist. There are no other acute symptoms at this time.        History provided by:  Patient and parent  Flank Pain   Pain location:  L flank  Pain radiates to:  LLQ  Pain severity:  Moderate  Onset quality:  Sudden  Duration:  1 day  Timing:  Intermittent  Progression:  Worsening  Chronicity:  New  Relieved by:  Nothing  Worsened by:  Nothing  Ineffective treatments: Toradol.  Associated symptoms: chills, nausea and vomiting    Associated symptoms: no fever        Review of Systems   Constitutional: Positive for chills. Negative for fever.   Gastrointestinal: Positive for nausea and vomiting.   Genitourinary: Positive for flank pain.   All other systems reviewed and are negative.      Past Medical History:   Diagnosis Date   • Hearing loss    • HLD (hyperlipidemia)    • Morbid obesity (CMS/HCC)    • Muckle-Wells syndrome (CMS/HCC)    • PCOS (polycystic ovarian syndrome)        Allergies   Allergen Reactions   • Dramamine [Dimenhydrinate] Angioedema       Past Surgical History:   Procedure Laterality Date   • APPENDECTOMY  2014   • CHOLECYSTECTOMY  2014   • SINUS SURGERY     • TONSILLECTOMY AND ADENOIDECTOMY  1999       Family History   Problem Relation Age of Onset   • No Known Problems Mother    • Kidney nephrosis Father        Social History     Socioeconomic History   • Marital status: Single     Spouse name: N/A   • Number of children: 0   • Years of education: H.S.   • Highest education level: High school graduate   Occupational History   • Occupation: Cosmotology     Employer: [employer]   Tobacco Use   • Smoking status: Never Smoker   • Smokeless tobacco: Never Used   Substance and Sexual Activity   • Alcohol use: No   • Drug use: No   • Sexual activity: Not Currently         Objective   Physical Exam   Constitutional: She is oriented to person, place, and time. She appears well-developed and well-nourished. No distress.   HENT:   Head: Normocephalic and atraumatic.   Nose: Nose normal.   Eyes: Conjunctivae are normal. No scleral icterus.   Neck: Normal range of motion. Neck supple.   Cardiovascular: Normal rate, regular rhythm and normal heart sounds.   No murmur heard.  Pulmonary/Chest: Effort normal and breath sounds normal. No respiratory distress.   Abdominal: Soft. There is tenderness.   She is mildly tender in the left upper quadrant and over the left kidney.   Musculoskeletal: Normal range of motion. She exhibits no edema.   Neurological: She is alert and oriented to person, place, and time.   Skin: Skin is warm and dry.   Psychiatric: She has a normal mood and affect. Her behavior is normal.   Nursing note and vitals reviewed.      Procedures         ED Course  ED Course as of Sep 10 0625   Sun Sep 08, 2019   [address] [place] [address] request number: [id number]   [JW]   1057 Mony is ambulatory up to the bathroom.  She reports that she initially felt better but it feels like her pain is starting to come back.  Its been 5 hours since she took 10 mg of oral Toradol.  Will give an IV dose of that as well as Flomax  [DT]   1052 We have called the hospital in [place] several times and they advised us that their computers were down yesterday and the CAT scan results are not available due to that.  [DT]   1351 Mony is sleeping.  Mom tells me she woke up and still had some nausea and pain.  She has now gone back to sleep, will reassess when she wakes up  [DT]   1422 Her mother and I woke her up.  She appears comfortable although reports still having some pain but does not appear to be in  any physical discomfort.  Her mother is going to take her home and treat her with oral medicines  [DT]   1434 Ms. Tnioco is ambulatory up to the bathroom and tells us now that she is having too much pain to go home and does not think she will be able to manage this at home.  I have ordered a CAT scan of her abdomen and pelvis as we have no way of getting the results of the one she had done yesterday.  I will try an oral dose of Dilaudid as well.  [DT]   1436 She is actively vomiting and will not be able to hold down any oral medications.  We will repeat her Phenergan  [DT]   1547 CT shows that her stone is now in the distal ureter  [DT]   1622 Paged the hospitalist on call  [DT]   1625 D/w Dr Will who will admit  [DT]      ED Course User Index  [DT] David Corado MD  [JW] Geovani Villasenor     Recent Results (from the past 24 hour(s))   CBC Auto Differential    Collection Time: 09/09/19  7:34 AM   Result Value Ref Range    WBC 5.00 3.40 - 10.80 10*3/mm3    RBC 4.70 3.77 - 5.28 10*6/mm3    Hemoglobin 13.5 12.0 - 15.9 g/dL    Hematocrit 41.0 34.0 - 46.6 %    MCV 87.2 79.0 - 97.0 fL    MCH 28.7 26.6 - 33.0 pg    MCHC 32.9 31.5 - 35.7 g/dL    RDW 11.7 (L) 12.3 - 15.4 %    RDW-SD 37.7 37.0 - 54.0 fl    MPV 10.7 6.0 - 12.0 fL    Platelets 198 140 - 450 10*3/mm3    Neutrophil % 58.8 42.7 - 76.0 %    Lymphocyte % 30.0 19.6 - 45.3 %    Monocyte % 9.0 5.0 - 12.0 %    Eosinophil % 0.8 0.3 - 6.2 %    Basophil % 0.6 0.0 - 1.5 %    Immature Grans % 0.8 (H) 0.0 - 0.5 %    Neutrophils, Absolute 2.94 1.70 - 7.00 10*3/mm3    Lymphocytes, Absolute 1.50 0.70 - 3.10 10*3/mm3    Monocytes, Absolute 0.45 0.10 - 0.90 10*3/mm3    Eosinophils, Absolute 0.04 0.00 - 0.40 10*3/mm3    Basophils, Absolute 0.03 0.00 - 0.20 10*3/mm3    Immature Grans, Absolute 0.04 0.00 - 0.05 10*3/mm3    nRBC 0.0 0.0 - 0.2 /100 WBC   Basic Metabolic Panel    Collection Time: 09/09/19  7:34 AM   Result Value Ref Range    Glucose 97 65 - 99 mg/dL    BUN 10 6 - 20  mg/dL    Creatinine 0.67 0.57 - 1.00 mg/dL    Sodium 139 136 - 145 mmol/L    Potassium 3.9 3.5 - 5.2 mmol/L    Chloride 101 98 - 107 mmol/L    CO2 27.0 22.0 - 29.0 mmol/L    Calcium 8.6 8.6 - 10.5 mg/dL    eGFR Non African Amer 108 >60 mL/min/1.73    BUN/Creatinine Ratio 14.9 7.0 - 25.0    Anion Gap 11.0 5.0 - 15.0 mmol/L   Hemoglobin A1c    Collection Time: 09/09/19  7:35 AM   Result Value Ref Range    Hemoglobin A1C 5.10 4.80 - 5.60 %     Note: In addition to lab results from this visit, the labs listed above may include labs taken at another facility or during a different encounter within the last 24 hours. Please correlate lab times with ED admission and discharge times for further clarification of the services performed during this visit.    CT Abdomen Pelvis Without Contrast   Preliminary Result   1.  Obstructing distal left ureteral 2 mm calculus at the   ureterovesicular junction with resultant mild hydroureteronephrosis and   extensive inflammation of the left kidney.       2.  Punctate nonobstructing right renal calculus.       D:  09/08/2019   E:  09/09/2019                    Vitals:    09/09/19 1144 09/09/19 1544 09/09/19 1922 09/10/19 0440   BP: 95/64 114/73 136/86 101/53   BP Location: Right arm Right arm Right arm Right arm   Patient Position: Lying Lying Lying Lying   Pulse:       Resp: 18 18 18 18   Temp: 98 °F (36.7 °C) 98.2 °F (36.8 °C) 98 °F (36.7 °C) 98.1 °F (36.7 °C)   TempSrc: Oral Oral Oral Oral   SpO2:       Weight:       Height:         Medications   sodium chloride 0.9 % flush 10 mL (not administered)   sodium chloride 0.9 % flush 10 mL (10 mL Intravenous Not Given 9/9/19 2128)   sodium chloride 0.9 % flush 10 mL (not administered)   sodium chloride 0.9 % infusion (125 mL/hr Intravenous New Bag 9/9/19 1419)   acetaminophen (TYLENOL) tablet 650 mg (650 mg Oral Given 9/9/19 0804)     Or   acetaminophen (TYLENOL) 160 MG/5ML solution 650 mg ( Oral Not Given:  See Alt 9/9/19 0856)     Or    acetaminophen (TYLENOL) suppository 650 mg ( Rectal Not Given:  See Alt 9/9/19 0804)   HYDROcodone-acetaminophen (NORCO) 5-325 MG per tablet 1 tablet (1 tablet Oral Given 9/9/19 1834)   HYDROmorphone (DILAUDID) injection 0.5 mg (0.5 mg Intravenous Given 9/9/19 2128)     And   naloxone (NARCAN) injection 0.4 mg (not administered)   ondansetron (ZOFRAN) tablet 4 mg ( Oral Not Given:  See Alt 9/9/19 2128)     Or   ondansetron (ZOFRAN) injection 4 mg (4 mg Intravenous Given 9/9/19 2128)   famotidine (PEPCID) tablet 40 mg (40 mg Oral Given 9/9/19 0804)   cefTRIAXone (ROCEPHIN) 1 g/100 mL 0.9% NS (MBP) (1 g Intravenous New Bag 9/9/19 1834)   norethindrone-ethinyl estradiol (PHILITH) 0.4-35 MG-MCG per tablet 1 tablet *Patient Supplied* (1 tablet Oral Given 9/9/19 1002)   tamsulosin (FLOMAX) 24 hr capsule 0.4 mg (0.4 mg Oral Given 9/9/19 1419)   magnesium hydroxide (MILK OF MAGNESIA) suspension 2400 mg/10mL 15 mL (15 mL Oral Given 9/9/19 2127)   sodium chloride 0.9 % bolus 1,000 mL (0 mL Intravenous Stopped 9/8/19 1040)   ondansetron (ZOFRAN) injection 4 mg (4 mg Intravenous Given 9/8/19 0855)   HYDROmorphone (DILAUDID) injection 0.5 mg (0.5 mg Intravenous Given 9/8/19 0854)   ketorolac (TORADOL) injection 15 mg (15 mg Intravenous Given 9/8/19 1114)   tamsulosin (FLOMAX) 24 hr capsule 0.4 mg (0.4 mg Oral Given 9/8/19 1114)   promethazine (PHENERGAN) injection 6.25 mg (6.25 mg Intravenous Given 9/8/19 1119)   HYDROmorphone (DILAUDID) injection 0.5 mg (0.5 mg Intravenous Given 9/8/19 1151)   HYDROmorphone (DILAUDID) injection 0.5 mg (0.5 mg Intravenous Given 9/8/19 1506)   HYDROmorphone (DILAUDID) injection 0.5 mg (0.5 mg Intravenous Given 9/8/19 1758)     ECG/EMG Results (last 24 hours)     ** No results found for the last 24 hours. **        No orders to display                       MDM  Number of Diagnoses or Management Options  Elevated blood-pressure reading without diagnosis of hypertension:   Hydronephrosis of left  kidney:   Intractable pain: new and requires workup  Intractable vomiting with nausea, unspecified vomiting type: new and requires workup  Left ureteral stone:      Amount and/or Complexity of Data Reviewed  Clinical lab tests: ordered and reviewed  Tests in the radiology section of CPT®: ordered and reviewed  Review and summarize past medical records: yes  Discuss the patient with other providers: yes  Independent visualization of images, tracings, or specimens: yes    Patient Progress  Patient progress: improved      Final diagnoses:   Left ureteral stone   Elevated blood-pressure reading without diagnosis of hypertension   Hydronephrosis of left kidney   Intractable vomiting with nausea, unspecified vomiting type   Intractable pain       Documentation assistance provided by analilia Villasenor.  Information recorded by the scribe was done at my direction and has been verified and validated by me.     Geovani Villasenor  09/08/19 0859       Geovani Villasenor  09/08/19 0900       David Corado MD  09/10/19 1153

## 2019-09-09 PROBLEM — N10 ACUTE PYELONEPHRITIS: Status: ACTIVE | Noted: 2019-09-09

## 2019-09-09 PROBLEM — N30.00 ACUTE CYSTITIS: Status: ACTIVE | Noted: 2019-09-09

## 2019-09-09 LAB
ANION GAP SERPL CALCULATED.3IONS-SCNC: 11 MMOL/L (ref 5–15)
BASOPHILS # BLD AUTO: 0.03 10*3/MM3 (ref 0–0.2)
BASOPHILS NFR BLD AUTO: 0.6 % (ref 0–1.5)
BUN BLD-MCNC: 10 MG/DL (ref 6–20)
BUN/CREAT SERPL: 14.9 (ref 7–25)
CALCIUM SPEC-SCNC: 8.6 MG/DL (ref 8.6–10.5)
CHLORIDE SERPL-SCNC: 101 MMOL/L (ref 98–107)
CO2 SERPL-SCNC: 27 MMOL/L (ref 22–29)
CREAT BLD-MCNC: 0.67 MG/DL (ref 0.57–1)
DEPRECATED RDW RBC AUTO: 37.7 FL (ref 37–54)
EOSINOPHIL # BLD AUTO: 0.04 10*3/MM3 (ref 0–0.4)
EOSINOPHIL NFR BLD AUTO: 0.8 % (ref 0.3–6.2)
ERYTHROCYTE [DISTWIDTH] IN BLOOD BY AUTOMATED COUNT: 11.7 % (ref 12.3–15.4)
GFR SERPL CREATININE-BSD FRML MDRD: 108 ML/MIN/1.73
GLUCOSE BLD-MCNC: 97 MG/DL (ref 65–99)
HBA1C MFR BLD: 5.1 % (ref 4.8–5.6)
HCT VFR BLD AUTO: 41 % (ref 34–46.6)
HGB BLD-MCNC: 13.5 G/DL (ref 12–15.9)
IMM GRANULOCYTES # BLD AUTO: 0.04 10*3/MM3 (ref 0–0.05)
IMM GRANULOCYTES NFR BLD AUTO: 0.8 % (ref 0–0.5)
LYMPHOCYTES # BLD AUTO: 1.5 10*3/MM3 (ref 0.7–3.1)
LYMPHOCYTES NFR BLD AUTO: 30 % (ref 19.6–45.3)
MCH RBC QN AUTO: 28.7 PG (ref 26.6–33)
MCHC RBC AUTO-ENTMCNC: 32.9 G/DL (ref 31.5–35.7)
MCV RBC AUTO: 87.2 FL (ref 79–97)
MONOCYTES # BLD AUTO: 0.45 10*3/MM3 (ref 0.1–0.9)
MONOCYTES NFR BLD AUTO: 9 % (ref 5–12)
NEUTROPHILS # BLD AUTO: 2.94 10*3/MM3 (ref 1.7–7)
NEUTROPHILS NFR BLD AUTO: 58.8 % (ref 42.7–76)
NRBC BLD AUTO-RTO: 0 /100 WBC (ref 0–0.2)
PLATELET # BLD AUTO: 198 10*3/MM3 (ref 140–450)
PMV BLD AUTO: 10.7 FL (ref 6–12)
POTASSIUM BLD-SCNC: 3.9 MMOL/L (ref 3.5–5.2)
RBC # BLD AUTO: 4.7 10*6/MM3 (ref 3.77–5.28)
SODIUM BLD-SCNC: 139 MMOL/L (ref 136–145)
WBC NRBC COR # BLD: 5 10*3/MM3 (ref 3.4–10.8)

## 2019-09-09 PROCEDURE — 25010000002 CEFTRIAXONE PER 250 MG: Performed by: INTERNAL MEDICINE

## 2019-09-09 PROCEDURE — 83036 HEMOGLOBIN GLYCOSYLATED A1C: CPT | Performed by: FAMILY MEDICINE

## 2019-09-09 PROCEDURE — 85025 COMPLETE CBC W/AUTO DIFF WBC: CPT | Performed by: FAMILY MEDICINE

## 2019-09-09 PROCEDURE — 80048 BASIC METABOLIC PNL TOTAL CA: CPT | Performed by: FAMILY MEDICINE

## 2019-09-09 PROCEDURE — 25010000002 ONDANSETRON PER 1 MG: Performed by: FAMILY MEDICINE

## 2019-09-09 PROCEDURE — 99233 SBSQ HOSP IP/OBS HIGH 50: CPT | Performed by: INTERNAL MEDICINE

## 2019-09-09 PROCEDURE — 25010000002 HYDROMORPHONE PER 4 MG: Performed by: FAMILY MEDICINE

## 2019-09-09 RX ORDER — TAMSULOSIN HYDROCHLORIDE 0.4 MG/1
0.4 CAPSULE ORAL DAILY
Status: DISCONTINUED | OUTPATIENT
Start: 2019-09-09 | End: 2019-09-10 | Stop reason: HOSPADM

## 2019-09-09 RX ADMIN — CEFTRIAXONE 1 G: 1 INJECTION, POWDER, FOR SOLUTION INTRAMUSCULAR; INTRAVENOUS at 18:34

## 2019-09-09 RX ADMIN — ONDANSETRON 4 MG: 2 INJECTION INTRAMUSCULAR; INTRAVENOUS at 21:28

## 2019-09-09 RX ADMIN — ACETAMINOPHEN 650 MG: 325 TABLET ORAL at 08:04

## 2019-09-09 RX ADMIN — SODIUM CHLORIDE 125 ML/HR: 9 INJECTION, SOLUTION INTRAVENOUS at 14:19

## 2019-09-09 RX ADMIN — HYDROCODONE BITARTRATE AND ACETAMINOPHEN 1 TABLET: 5; 325 TABLET ORAL at 18:34

## 2019-09-09 RX ADMIN — SODIUM CHLORIDE 125 ML/HR: 9 INJECTION, SOLUTION INTRAVENOUS at 05:51

## 2019-09-09 RX ADMIN — MAGNESIUM HYDROXIDE 15 ML: 2400 SUSPENSION ORAL at 14:19

## 2019-09-09 RX ADMIN — HYDROMORPHONE HYDROCHLORIDE 0.5 MG: 1 INJECTION, SOLUTION INTRAMUSCULAR; INTRAVENOUS; SUBCUTANEOUS at 21:28

## 2019-09-09 RX ADMIN — FAMOTIDINE 40 MG: 20 TABLET ORAL at 08:04

## 2019-09-09 RX ADMIN — TAMSULOSIN HYDROCHLORIDE 0.4 MG: 0.4 CAPSULE ORAL at 14:19

## 2019-09-09 RX ADMIN — MAGNESIUM HYDROXIDE 15 ML: 2400 SUSPENSION ORAL at 21:27

## 2019-09-09 NOTE — PLAN OF CARE
Problem: Patient Care Overview  Goal: Plan of Care Review  Outcome: Ongoing (interventions implemented as appropriate)   09/09/19 2106   Coping/Psychosocial   Plan of Care Reviewed With patient;parent   Plan of Care Review   Progress no change   OTHER   Outcome Summary patients continues to have urine strained, no visible signs of stone in urine, her urine is clear yellow, she remains independant, roomair, was prescribed milk of magnesium to relieve the constipation, patient had a BM

## 2019-09-09 NOTE — CONSULTS
Urology Consult    Date of Admission: 9/8/2019  Date of Consult: 09/09/19    Primary Care Physician: Provider, No Known  Referring Physician: Harvey Holland  Primary physician, Jordy Scott    Chief complaint/Reason for consultation left ureteral calculus  Subjective     History of Present Illness 24-year-old white female who has a history of kidney stones beginning at age 16.  She is seen Dr. Larry Abdi in his past,  For and has passed 2 to 3 stones over her lifetime and  Has  had no operative intervention for stone disease.  2- 3 days ago she began having pain in her left back and flank rating around her left lower quadrant.   She returned to the emergency room yesterday and was admitted for pain control.  She has had nausea and vomiting for most the day yesterday but today that has relented.  She denies any fever chills she has no hematuria or voiding symptoms.      Review of Systems she denies any shortness of breath chest pain or vision changes.  She has not had a bowel movement in 3 days and has no dysuria or hematuria.  Otherwise complete ROS is negative except as mentioned above.    Past Medical History:  has a past medical history of Hearing loss, HLD (hyperlipidemia), Morbid obesity (CMS/HCC), Muckle-Wells syndrome (CMS/HCC), and PCOS (polycystic ovarian syndrome).    Past Surgical History:  has a past surgical history that includes Sinus surgery; Cholecystectomy (2014); tonsillectomy and adenoidectomy (1999); and Appendectomy (2014).    Family History: family history includes Kidney nephrosis in her father; No Known Problems in her mother.    Social History:  reports that she has never smoked. She has never used smokeless tobacco. She reports that she does not drink alcohol or use drugs.  She is single    Medications: Scheduled Meds:  ceftriaxone 1 g Intravenous Q24H   famotidine 40 mg Oral Daily   magnesium hydroxide 15 mL Oral BID   norethindrone-ethinyl estradiol 1 tablet Oral Daily   sodium chloride  10 mL Intravenous Q12H   tamsulosin 0.4 mg Oral Daily     Continuous Infusions:  sodium chloride 125 mL/hr Last Rate: 125 mL/hr (09/09/19 0551)     PRN Meds:.•  acetaminophen **OR** acetaminophen **OR** acetaminophen  •  HYDROcodone-acetaminophen  •  HYDROmorphone **AND** naloxone  •  ondansetron **OR** ondansetron  •  [COMPLETED] Insert peripheral IV **AND** sodium chloride  •  sodium chloride  Allergies   Allergen Reactions   • Dramamine [Dimenhydrinate] Angioedema       Objective    Physical Exam:   Vital Signs have been reviewed  Physical Exam  Well-nourished white female in no acute distress  HEENT: NCAT PERRLA EOMI  Heart: Regular rhythm  Lungs: Clear to auscultation     abdomen: Doughy soft and mildly tender in the left flank.  Extremities: Free of sinus clubbing or edema  Neurologic: Grossly intact  Psych: Normal    Results Reviewed:  I have personally reviewed current lab, radiology, and data and agree with results.  CT scan abdomen pelvis shows a 1 to 2 mm stone in her left UVJ with resultant hydronephrosis on the left side possible some stranding due to extravasation or forniceal rupture.  Also has been a 2 mm stone in her right mid calyx  Results from last 7 days   Lab Units 09/09/19  0734   WBC 10*3/mm3 5.00   HEMOGLOBIN g/dL 13.5   PLATELETS 10*3/mm3 198     Results from last 7 days   Lab Units 09/09/19  0734   SODIUM mmol/L 139   POTASSIUM mmol/L 3.9   CO2 mmol/L 27.0   BUN mg/dL 10   CREATININE mg/dL 0.67   GLUCOSE mg/dL 97   CALCIUM mg/dL 8.6             Hydroureteronephrosis    Kidney stones    Morbid obesity (CMS/HCC)    Muckle-Wells syndrome (CMS/HCC)    PCOS (polycystic ovarian syndrome)    Intractable pain    Acute cystitis        Assessment/Plan   Assessment & Plan #1 small distal left ureteral calculus.  Plan alpha-blocker and a strain urine for medical expulsive therapy.  I explained the greater than 90% chance of passage of   a stone of the size.  I will allow her to go home as soon as the  question and her mother stone passes or tomorrow at  the latest if she is feeling better.      I discussed the patients findings and my recommendations with: Mony   and her mother    Otoniel Palmer MD  09/09/19  1:39 PM

## 2019-09-09 NOTE — PROGRESS NOTES
Discharge Planning Assessment  [facility]     Patient Name: Moyn Tinoco  MRN: [id number]  Today's Date: 9/9/2019    Admit Date: 9/8/2019    Discharge Needs Assessment     Row Name 09/09/19 1215       Living Environment    Lives With  parent(s)    Name(s) of Who Lives With Patient  Juan Chakraborty    Current Living Arrangements  home/apartment/condo    Primary Care Provided by  self    Provides Primary Care For  no one    Family Caregiver if Needed  parent(s)    Family Caregiver Names  Juan Chakraborty    Quality of Family Relationships  supportive;involved;helpful    Able to Return to Prior Arrangements  yes    Living Arrangement Comments  Lives with mother in house with steps       Resource/Environmental Concerns    Resource/Environmental Concerns  none    Transportation Concerns  car, none       Transition Planning    Patient/Family Anticipates Transition to  home with family    Patient/Family Anticipated Services at Transition  none    Transportation Anticipated  family or friend will provide       Discharge Needs Assessment    Readmission Within the Last 30 Days  no previous admission in last 30 days    Concerns to be Addressed  no discharge needs identified    Equipment Currently Used at Home  none    Anticipated Changes Related to Illness  none    Equipment Needed After Discharge  none        Discharge Plan     Row Name 09/09/19 1216       Plan    Plan  Plan is home with mother at discharge    Patient/Family in Agreement with Plan  yes    Plan Comments  Met with patient and mother in the room for initial discharge planning.  Patient is independent with ADL and IADL.  Lives with mother in house with steps she uses without difficulty.  No home health or DME.  PCP is Olivia Lora from [facility] [place].  Plan is home with mother at discharge.  Following for needs.      Final Discharge Disposition Code  01 - home or self-care        Destination      No service coordination in this encounter.      Durable Medical  Equipment      No service coordination in this encounter.      Dialysis/Infusion      No service coordination in this encounter.      Home Medical Care      No service coordination in this encounter.      Therapy      No service coordination in this encounter.      Community Resources      No service coordination in this encounter.          Demographic Summary     Row Name 09/09/19 1214       General Information    Admission Type  inpatient    Arrived From  emergency department    Referral Source  admission list    Reason for Consult  discharge planning    Preferred Language  English        Functional Status     Row Name 09/09/19 1214       Functional Status    Usual Activity Tolerance  excellent    Current Activity Tolerance  excellent       Functional Status, IADL    Medications  independent    Meal Preparation  independent    Housekeeping  independent    Laundry  independent    Shopping  independent        Psychosocial    No documentation.       Abuse/Neglect    No documentation.       Legal    No documentation.       Substance Abuse    No documentation.       Patient Forms    No documentation.           Sharyn Kohler RN

## 2019-09-09 NOTE — PROGRESS NOTES
[facility] Medicine Services  PROGRESS NOTE    Patient Name: Mony Tinoco  : 1995  MRN: [id number]    Date of Admission: 2019  Length of Stay: 0  Primary Care Physician: Provider, No Known    Subjective   Subjective     CC: f/u flank pain    HPI: Up in bed without family in room. Pain a little better. To her knowledge, has not passed stone.    Review of Systems  Gen- No fevers, chills  CV- No chest pain, palpitations  Resp- No cough, dyspnea  GI- No N/V/D, abd pain    All other systems reviewed and negative except any additional pertinent positives and negatives discussed in HPI.     Objective   Objective     Vital Signs:   Temp:  [97.7 °F (36.5 °C)-98.6 °F (37 °C)] 98.2 °F (36.8 °C)  Heart Rate:  [78-83] 80  Resp:  [16-18] 18  BP: ()/(61-89) 125/87        Physical Exam:  Constitutional: No acute distress, awake, alert, comfortable  HENT: NCAT, mucous membranes moist  Respiratory: Clear to auscultation bilaterally, respiratory effort normal   Cardiovascular: RRR, no murmurs, rubs, or gallops, palpable pedal pulses bilaterally  Gastrointestinal: Positive bowel sounds, soft, nontender, nondistended, obese  Musculoskeletal: No bilateral ankle edema  Psychiatric: Appropriate affect, cooperative  Neurologic: Oriented x 3, strength symmetric in all extremities, Cranial Nerves grossly intact to confrontation, speech clear  Skin: No rashes    Results Reviewed:    Results from last 7 days   Lab Units 19  0734 19  0852   WBC 10*3/mm3 5.00 7.47   HEMOGLOBIN g/dL 13.5 13.9   HEMATOCRIT % 41.0 41.7   PLATELETS 10*3/mm3 198 224     Results from last 7 days   Lab Units 19  0734 19  0852   SODIUM mmol/L 139 139   POTASSIUM mmol/L 3.9 4.3   CHLORIDE mmol/L 101 101   CO2 mmol/L 27.0 24.0   BUN mg/dL 10 15   CREATININE mg/dL 0.67 0.73   GLUCOSE mg/dL 97 128*   CALCIUM mg/dL 8.6 9.1   ALT (SGPT) U/L  --  31   AST (SGOT) U/L  --  31     Estimated Creatinine Clearance:  148.6 mL/min (by C-G formula based on SCr of 0.67 mg/dL).    Microbiology Results Abnormal     None        Personally reviewed CT A/P with left hydronephrosis noted. Agree with interpretation.   Imaging Results (last 24 hours)     Procedure Component Value Units Date/Time    CT Abdomen Pelvis Without Contrast [[id number]] Collected:  09/08/19 1509     Updated:  09/09/19 0936    Narrative:       EXAMINATION: CT ABDOMEN AND PELVIS WO CONTRAST-09/08/2019:      INDICATION: Right flank pain; N20.1-Calculus of ureter; R03.0-Elevated  blood-pressure reading, without diagnosis of hypertension.     TECHNIQUE: CT of the abdomen and pelvis without intravenous contrast was  performed. Additional coronal and sagittal reformatted imaging was  provided for review.     The radiation dose reduction device was turned on for each scan per the  ALARA (As Low as Reasonably Achievable) protocol.     COMPARISON: CT of the abdomen and pelvis 10/30/2016.     FINDINGS: Dedicated CT imaging of the lower chest demonstrates no acute  cardiomegaly process. The liver is unremarkable in appearance.  Postcholecystectomy changes are identified. The spleen, adrenal glands,  and pancreas do not demonstrate abnormality.     There is edema and enlargement of the left kidney with surrounding left  perinephric inflammation with mild left hydroureteronephrosis with an  obstructing distal punctate 2 mm left ureteral calculus near the  ureterovesicular junction. The urinary bladder is decompressed and  otherwise unremarkable. No evidence of right-sided hydronephrosis or  obstructive uropathy. Nonobstructing 3 mm right renal calculus is  identified. No perinephric fluid collection identified.     The uterus is unremarkable. Cystic changes are suggested involving the  bilateral ovaries without obvious abnormality. The stomach is  unremarkable in appearance. The small bowel is nondilated. No evidence  of bowel obstruction appreciated. The appendix is not  definitively  identified, however, no right lower quadrant inflammation is  appreciated.  The colon does not demonstrate obvious abnormality. The  descending colon is decompressed and otherwise unremarkable. No free air  or free fluid is identified. The surrounding soft tissues do not  demonstrate abnormality. The bony pelvis appears intact.       Impression:       1.  Obstructing distal left ureteral 2 mm calculus at the  ureterovesicular junction with resultant mild hydroureteronephrosis and  extensive inflammation of the left kidney.     2.  Punctate nonobstructing right renal calculus.     D:  09/08/2019  E:  09/09/2019                        I have reviewed the medications:  Scheduled Meds:    ceftriaxone 1 g Intravenous Q24H   famotidine 40 mg Oral Daily   norethindrone-ethinyl estradiol 1 tablet Oral Daily   sodium chloride 10 mL Intravenous Q12H     Continuous Infusions:    sodium chloride 125 mL/hr Last Rate: 125 mL/hr (09/09/19 0551)     PRN Meds:.•  acetaminophen **OR** acetaminophen **OR** acetaminophen  •  HYDROcodone-acetaminophen  •  HYDROmorphone **AND** naloxone  •  ondansetron **OR** ondansetron  •  [COMPLETED] Insert peripheral IV **AND** sodium chloride  •  sodium chloride      Assessment/Plan   Assessment / Plan     Active Hospital Problems    Diagnosis  POA   • **Hydroureteronephrosis [N13.30]  Yes   • Acute cystitis [N30.00]  Yes   • Kidney stones [N20.0]  Yes   • Intractable pain [R52]  Yes   • Morbid obesity (CMS/HCC) [E66.01]  Yes   • 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        Brief Hospital Course to date:  Mony Tinoco is a 24 y.o. female presenting with flank pain found to have obstructing renal stone and UTI.    A/P:    Obstructing nephrolithiasis w/ hydronephrosis  UTI  --Urology to see regarding potential need for procedure. Continue IVF, IV narcotics for  pain control for now.  --Continue IV rocephin even though specimen contaminated, in light up potential instrumentation.    PCOS  Morbid Obesity    DVT Prophylaxis: Mechanical    Disposition: I expect the patient to be discharged pending urologic eval.    CODE STATUS:   Code Status and Medical Interventions:   Ordered at: 09/08/19 5960     Level Of Support Discussed With:    Patient     Code Status:    CPR     Medical Interventions (Level of Support Prior to Arrest):    Full         Electronically signed by Roxann Holland II, DO, 09/09/19, 10:40 AM.

## 2019-09-10 VITALS
HEIGHT: 64 IN | WEIGHT: 220 LBS | BODY MASS INDEX: 37.56 KG/M2 | DIASTOLIC BLOOD PRESSURE: 89 MMHG | RESPIRATION RATE: 16 BRPM | HEART RATE: 80 BPM | SYSTOLIC BLOOD PRESSURE: 121 MMHG | TEMPERATURE: 98.2 F | OXYGEN SATURATION: 95 %

## 2019-09-10 PROBLEM — N13.2 URETERAL STONE WITH HYDRONEPHROSIS: Status: ACTIVE | Noted: 2019-09-10

## 2019-09-10 PROBLEM — R52 INTRACTABLE PAIN: Status: RESOLVED | Noted: 2019-09-08 | Resolved: 2019-09-10

## 2019-09-10 PROBLEM — Z87.442 HISTORY OF KIDNEY STONES: Status: ACTIVE | Noted: 2019-09-08

## 2019-09-10 PROBLEM — N30.01 ACUTE CYSTITIS WITH HEMATURIA: Status: ACTIVE | Noted: 2019-09-09

## 2019-09-10 PROCEDURE — 99239 HOSP IP/OBS DSCHRG MGMT >30: CPT | Performed by: PHYSICIAN ASSISTANT

## 2019-09-10 RX ORDER — ONDANSETRON 4 MG/1
4 TABLET, FILM COATED ORAL EVERY 6 HOURS PRN
Qty: 12 TABLET | Refills: 0 | Status: SHIPPED | OUTPATIENT
Start: 2019-09-10

## 2019-09-10 RX ORDER — CEFUROXIME AXETIL 250 MG/1
250 TABLET ORAL EVERY 12 HOURS SCHEDULED
Qty: 9 TABLET | Refills: 0 | Status: SHIPPED | OUTPATIENT
Start: 2019-09-10 | End: 2019-09-10

## 2019-09-10 RX ORDER — CEFUROXIME AXETIL 250 MG/1
250 TABLET ORAL EVERY 12 HOURS SCHEDULED
Status: DISCONTINUED | OUTPATIENT
Start: 2019-09-10 | End: 2019-09-10 | Stop reason: HOSPADM

## 2019-09-10 RX ORDER — ACETAMINOPHEN 325 MG/1
650 TABLET ORAL EVERY 4 HOURS PRN
Start: 2019-09-10

## 2019-09-10 RX ORDER — CEFUROXIME AXETIL 250 MG/1
250 TABLET ORAL EVERY 12 HOURS SCHEDULED
Qty: 9 TABLET | Refills: 0 | Status: SHIPPED | OUTPATIENT
Start: 2019-09-10 | End: 2019-09-15

## 2019-09-10 RX ORDER — TAMSULOSIN HYDROCHLORIDE 0.4 MG/1
1 CAPSULE ORAL NIGHTLY
Qty: 30 CAPSULE | Refills: 0 | Status: SHIPPED | OUTPATIENT
Start: 2019-09-10 | End: 2019-09-10 | Stop reason: SDUPTHER

## 2019-09-10 RX ORDER — ONDANSETRON 4 MG/1
4 TABLET, FILM COATED ORAL EVERY 6 HOURS PRN
Qty: 12 TABLET | Refills: 0 | Status: SHIPPED | OUTPATIENT
Start: 2019-09-10 | End: 2019-09-10

## 2019-09-10 RX ORDER — TAMSULOSIN HYDROCHLORIDE 0.4 MG/1
1 CAPSULE ORAL NIGHTLY
Qty: 30 CAPSULE | Refills: 0 | Status: SHIPPED | OUTPATIENT
Start: 2019-09-10

## 2019-09-10 RX ORDER — HYDROCODONE BITARTRATE AND ACETAMINOPHEN 5; 325 MG/1; MG/1
1 TABLET ORAL EVERY 6 HOURS PRN
Qty: 10 TABLET | Refills: 0 | Status: SHIPPED | OUTPATIENT
Start: 2019-09-10

## 2019-09-10 RX ADMIN — FAMOTIDINE 40 MG: 20 TABLET ORAL at 08:01

## 2019-09-10 RX ADMIN — TAMSULOSIN HYDROCHLORIDE 0.4 MG: 0.4 CAPSULE ORAL at 08:01

## 2019-09-10 NOTE — PROGRESS NOTES
"Daily Progress Note    Patient: [facility] Hospital Day: 2    Subjective: Feeling much better today.  No pain      Objective:     /53 (BP Location: Right arm, Patient Position: Lying)   Pulse 80   Temp 98.1 °F (36.7 °C) (Oral)   Resp 18   Ht 162.6 cm (64\")   Wt 99.8 kg (220 lb)   LMP 09/06/2019   SpO2 95%   BMI 37.76 kg/m²       Intake/Output Summary (Last 24 hours) at 9/10/2019 0727  Last data filed at 9/9/2019 2156  Gross per 24 hour   Intake 2714 ml   Output 1350 ml   Net 1364 ml         Physical Exam:   General Appearance: alert, appears stated age and cooperative  Head: normocephalic, without obvious abnormality and atraumatic  Lungs respirations regular  Abdomen no masses and soft non-tender    Extremities moves extremities well, no edema, no cyanosis and no redness      Lab Results (last 24 hours)     Procedure Component Value Units Date/Time    Basic Metabolic Panel [[id number]]  (Normal) Collected:  09/09/19 0734    Specimen:  Blood Updated:  09/09/19 0824     Glucose 97 mg/dL      BUN 10 mg/dL      Creatinine 0.67 mg/dL      Sodium 139 mmol/L      Potassium 3.9 mmol/L      Chloride 101 mmol/L      CO2 27.0 mmol/L      Calcium 8.6 mg/dL      eGFR Non African Amer 108 mL/min/1.73      BUN/Creatinine Ratio 14.9     Anion Gap 11.0 mmol/L     Narrative:       GFR Normal >60  Chronic Kidney Disease <60  Kidney Failure <15    Hemoglobin A1c [[id number]]  (Normal) Collected:  09/09/19 0735    Specimen:  Blood Updated:  09/09/19 0822     Hemoglobin A1C 5.10 %     Narrative:       Hemoglobin A1C Ranges:    Increased Risk for Diabetes  5.7% to 6.4%  Diabetes                     >= 6.5%  Diabetic Goal                < 7.0%    CBC Auto Differential [[id number]]  (Abnormal) Collected:  09/09/19 0734    Specimen:  Blood Updated:  09/09/19 0817     WBC 5.00 10*3/mm3      RBC 4.70 10*6/mm3      Hemoglobin 13.5 g/dL      Hematocrit 41.0 %      MCV 87.2 fL      MCH 28.7 pg      MCHC 32.9 g/dL      RDW 11.7 %  "     RDW-SD 37.7 fl      MPV 10.7 fL      Platelets 198 10*3/mm3      Neutrophil % 58.8 %      Lymphocyte % 30.0 %      Monocyte % 9.0 %      Eosinophil % 0.8 %      Basophil % 0.6 %      Immature Grans % 0.8 %      Neutrophils, Absolute 2.94 10*3/mm3      Lymphocytes, Absolute 1.50 10*3/mm3      Monocytes, Absolute 0.45 10*3/mm3      Eosinophils, Absolute 0.04 10*3/mm3      Basophils, Absolute 0.03 10*3/mm3      Immature Grans, Absolute 0.04 10*3/mm3      nRBC 0.0 /100 WBC             Assessment/Plan: Small left distal ureteral calculus.  She has a high likelihood of spontaneous stone passage.  Since she is doing well I would recommend home any time for continued trial of stone passage.  I will see her back in the office in follow-up in 2 to 3 weeks.        Patient Active Problem List   Diagnosis   • Hydroureteronephrosis   • Kidney stones   • Morbid obesity (CMS/HCC)   • Muckle-Wells syndrome (CMS/HCC)   • PCOS (polycystic ovarian syndrome)   • Intractable pain   • Acute cystitis        Diagnosis Plan   1. Intractable pain     2. Left ureteral stone     3. Elevated blood-pressure reading without diagnosis of hypertension     4. Hydronephrosis of left kidney     5. Intractable vomiting with nausea, unspecified vomiting type           Otoniel Palmer MD - 9/10/2019, 7:27 AM

## 2019-09-10 NOTE — PLAN OF CARE
Problem: Patient Care Overview  Goal: Plan of Care Review  Outcome: Ongoing (interventions implemented as appropriate)   09/10/19 0222   Coping/Psychosocial   Plan of Care Reviewed With patient   Plan of Care Review   Progress no change   OTHER   Outcome Summary Patient's VSS, UOP adequate. No sediment or stone discovered when straining urine. Patient has been NPO since midnight for urology consult in am. Slept well throughout shift. Will continue to monitor.       Problem: Pain, Acute (Adult)  Goal: Identify Related Risk Factors and Signs and Symptoms  Outcome: Ongoing (interventions implemented as appropriate)   09/10/19 0222   Pain, Acute (Adult)   Related Risk Factors (Acute Pain) disease process;patient perception;persistent pain   Signs and Symptoms (Acute Pain) facial mask of pain/grimace;guarding/abnormal posturing/positioning;questions meaning    09/10/19 0222   Pain, Acute (Adult)   Related Risk Factors (Acute Pain) disease process;patient perception;persistent pain   Signs and Symptoms (Acute Pain) facial mask of pain/grimace;guarding/abnormal posturing/positioning;questions meaning of pain   of pain

## 2019-09-10 NOTE — DISCHARGE SUMMARY
[facility] Hospital Medicine Services  DISCHARGE SUMMARY    Patient Name: Mony Tinoco  : 1995  MRN: [id number]    Date of Admission: 2019  Date of Discharge: 9/10/2019  Primary Care Physician: Olivia Lora APRN    Consults     Date and Time Order Name Status Description    2019 0030 Inpatient Urology Consult          Hospital Course     Presenting Problem:   Hydroureteronephrosis [N13.30]  Hydroureteronephrosis [N13.30]    Active Hospital Problems    Diagnosis  POA   • **Left ureteral stone with hydronephrosis [N13.2]  Yes   • Acute cystitis with hematuria [N30.01]  Yes   • Hydroureteronephrosis [N13.30]  Yes   • History of kidney stones [Z87.442]  Yes   • Morbid obesity (CMS/HCC) [E66.01]  Yes   • 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 Mony Tinoco is a 24yoF with PMH significant for Muckle-Wells syndrome, PCOS, morbid obesity and HLD. She presented to [facility] ED on 2019 with complaints of flank pain. She reported sharp, L flank pain, worsening over 24 hours with associated nausea, emesis and chills. She reported a history of kidney stones in the past, managed by Dr. Abdi.     ED evaluation notable for an obstructing distal left ureteral 2 mm calculus at the UVJ with mild hydroureteronephrosis and and extensive inflammation of the L kidney. UA with trace blood, 1+ bacteria and 21-30 epithelial cells. She was admitted to the hospital medicine service and urology was consulted.     She was started on IV fluids and IV Rocephin. Dr. Palmer of urology recommended alpha blocker and conservative management, in hopes she can pass stone without intervention (>90% chance that she will). She received pain control and antiemetics as needed.     Ms. Tinoco is improved. She will return  home in stable condition on 9/10/2019  Follow up with PCP in 1 week  Follow up with Dr. Palmer in 2-3 weeks  I discussed diagnoses and plan of care with the patient's mother at bedside on day of discharge     Day of Discharge     HPI:   Up in bed. Pain is improved. No further nausea or vomiting. Feels ready to return home today.     Review of Systems  Gen- No fevers, chills  CV- No chest pain, palpitations  Resp- No cough, dyspnea  GI- No N/V/D, abd pain    All other systems reviewed and negative except any additional pertinent positives and negatives discussed in HPI.     Vital Signs:   Temp:  [98 °F (36.7 °C)-98.2 °F (36.8 °C)] 98.2 °F (36.8 °C)  Resp:  [16-18] 16  BP: ()/(53-89) 121/89     Physical Exam:  Constitutional: No acute distress, awake, alert and conversant. Obese body habitus   HENT: NCAT, mucous membranes moist  Respiratory: Clear to auscultation bilaterally, respiratory effort normal   Cardiovascular: RRR, no murmurs, rubs, or gallops, palpable pedal pulses bilaterally  Gastrointestinal: Positive bowel sounds, soft, nontender, nondistended  Musculoskeletal: No bilateral ankle edema  Psychiatric: Appropriate affect, cooperative  Neurologic: Oriented x 3, strength symmetric in all extremities, Cranial Nerves grossly intact to confrontation, speech clear  Skin: No rashes    Pertinent  and/or Most Recent Results     Results from last 7 days   Lab Units 09/09/19  0734 09/08/19  0852   WBC 10*3/mm3 5.00 7.47   HEMOGLOBIN g/dL 13.5 13.9   HEMATOCRIT % 41.0 41.7   PLATELETS 10*3/mm3 198 224   SODIUM mmol/L 139 139   POTASSIUM mmol/L 3.9 4.3   CHLORIDE mmol/L 101 101   CO2 mmol/L 27.0 24.0   BUN mg/dL 10 15   CREATININE mg/dL 0.67 0.73   GLUCOSE mg/dL 97 128*   CALCIUM mg/dL 8.6 9.1     Results from last 7 days   Lab Units 09/08/19  0852   BILIRUBIN mg/dL 0.3   ALK PHOS U/L 78   ALT (SGPT) U/L 31   AST (SGOT) U/L 31     Results from last 7 days   Lab Units 09/09/19  0735   HEMOGLOBIN A1C % 5.10     Brief  Urine Lab Results  (Last result in the past 365 days)      Color   Clarity   Blood   Leuk Est   Nitrite   Protein   CREAT   Urine HCG        09/08/19 0857               Negative         Microbiology Results Abnormal     None        Imaging Results (all)     Procedure Component Value Units Date/Time    CT Abdomen Pelvis Without Contrast [[id number]] Collected:  09/08/19 1509     Updated:  09/09/19 0936    Narrative:       EXAMINATION: CT ABDOMEN AND PELVIS WO CONTRAST-09/08/2019:      INDICATION: Right flank pain; N20.1-Calculus of ureter; R03.0-Elevated  blood-pressure reading, without diagnosis of hypertension.     TECHNIQUE: CT of the abdomen and pelvis without intravenous contrast was  performed. Additional coronal and sagittal reformatted imaging was  provided for review.     The radiation dose reduction device was turned on for each scan per the  ALARA (As Low as Reasonably Achievable) protocol.     COMPARISON: CT of the abdomen and pelvis 10/30/2016.     FINDINGS: Dedicated CT imaging of the lower chest demonstrates no acute  cardiomegaly process. The liver is unremarkable in appearance.  Postcholecystectomy changes are identified. The spleen, adrenal glands,  and pancreas do not demonstrate abnormality.     There is edema and enlargement of the left kidney with surrounding left  perinephric inflammation with mild left hydroureteronephrosis with an  obstructing distal punctate 2 mm left ureteral calculus near the  ureterovesicular junction. The urinary bladder is decompressed and  otherwise unremarkable. No evidence of right-sided hydronephrosis or  obstructive uropathy. Nonobstructing 3 mm right renal calculus is  identified. No perinephric fluid collection identified.     The uterus is unremarkable. Cystic changes are suggested involving the  bilateral ovaries without obvious abnormality. The stomach is  unremarkable in appearance. The small bowel is nondilated. No evidence  of bowel obstruction appreciated.  The appendix is not definitively  identified, however, no right lower quadrant inflammation is  appreciated.  The colon does not demonstrate obvious abnormality. The  descending colon is decompressed and otherwise unremarkable. No free air  or free fluid is identified. The surrounding soft tissues do not  demonstrate abnormality. The bony pelvis appears intact.       Impression:       1.  Obstructing distal left ureteral 2 mm calculus at the  ureterovesicular junction with resultant mild hydroureteronephrosis and  extensive inflammation of the left kidney.     2.  Punctate nonobstructing right renal calculus.     D:  09/08/2019  E:  09/09/2019        Discharge Details        Discharge Medications      New Medications      Instructions Start Date   acetaminophen 325 MG tablet  Commonly known as:  TYLENOL   650 mg, Oral, Every 4 Hours PRN      cefuroxime 250 MG tablet  Commonly known as:  CEFTIN   250 mg, Oral, Every 12 Hours Scheduled, First dose on evening of 9/10      HYDROcodone-acetaminophen 5-325 MG per tablet  Commonly known as:  NORCO   1 tablet, Oral, Every 6 Hours PRN      ondansetron 4 MG tablet  Commonly known as:  ZOFRAN   4 mg, Oral, Every 6 Hours PRN      tamsulosin 0.4 MG capsule 24 hr capsule  Commonly known as:  FLOMAX   1 capsule, Oral, Nightly         Continue These Medications      Instructions Start Date   MELINDA (21) PO   Oral         Stop These Medications    diclofenac 50 MG EC tablet  Commonly known as:  VOLTAREN     ondansetron ODT 8 MG disintegrating tablet  Commonly known as:  ZOFRAN-ODT     traMADol 50 MG tablet  Commonly known as:  ULTRAM          Allergies   Allergen Reactions   • Dramamine [Dimenhydrinate] Angioedema     Discharge Disposition:  Home or Self Care    Diet:  Hospital:  Diet Order   Procedures   • Diet Regular     Discharge:   Diet Instructions     Diet: Regular; Thin      Discharge Diet:  Regular    Fluid Consistency:  Thin    DRINK LOTS OF WATER!!        Discharge  Activity:   Activity Instructions     Activity as Tolerated          CODE STATUS:    Code Status and Medical Interventions:   Ordered at: 09/08/19 3960     Level Of Support Discussed With:    Patient     Code Status:    CPR     Medical Interventions (Level of Support Prior to Arrest):    Full     No future appointments.    Additional Instructions for the Follow-ups that You Need to Schedule     Discharge Follow-up with PCP   As directed       Currently Documented PCP:    Olivia Lora APRN    PCP Phone Number:    [phone]     Follow Up Details:  PCP in 1 week         Discharge Follow-up with Specified Provider: Dr. Palmer in 2-3 weeks   As directed      To:  Dr. Palmer in 2-3 weeks             Time Spent on Discharge: 35 minutes    Electronically signed by Oksana Hernandez PA-C, 09/10/19, 8:30 AM.

## 2020-01-13 ENCOUNTER — TRANSCRIBE ORDERS (OUTPATIENT)
Dept: LAB | Facility: HOSPITAL | Age: 25
End: 2020-01-13

## 2020-01-13 ENCOUNTER — LAB (OUTPATIENT)
Dept: LAB | Facility: HOSPITAL | Age: 25
End: 2020-01-13

## 2020-01-13 DIAGNOSIS — M04.1 PERIODIC FEVER (HCC): ICD-10-CM

## 2020-01-13 DIAGNOSIS — M04.1 PERIODIC FEVER (HCC): Primary | ICD-10-CM

## 2020-01-13 LAB
BASOPHILS # BLD AUTO: 0.03 10*3/MM3 (ref 0–0.2)
BASOPHILS NFR BLD AUTO: 0.6 % (ref 0–1.5)
CRP SERPL-MCNC: 0.05 MG/DL (ref 0–0.5)
DEPRECATED RDW RBC AUTO: 37.8 FL (ref 37–54)
EOSINOPHIL # BLD AUTO: 0.03 10*3/MM3 (ref 0–0.4)
EOSINOPHIL NFR BLD AUTO: 0.6 % (ref 0.3–6.2)
ERYTHROCYTE [DISTWIDTH] IN BLOOD BY AUTOMATED COUNT: 12.5 % (ref 12.3–15.4)
HCT VFR BLD AUTO: 40.8 % (ref 34–46.6)
HGB BLD-MCNC: 14.2 G/DL (ref 12–15.9)
IMM GRANULOCYTES # BLD AUTO: 0.01 10*3/MM3 (ref 0–0.05)
IMM GRANULOCYTES NFR BLD AUTO: 0.2 % (ref 0–0.5)
LYMPHOCYTES # BLD AUTO: 1.68 10*3/MM3 (ref 0.7–3.1)
LYMPHOCYTES NFR BLD AUTO: 32.5 % (ref 19.6–45.3)
MCH RBC QN AUTO: 29.4 PG (ref 26.6–33)
MCHC RBC AUTO-ENTMCNC: 34.8 G/DL (ref 31.5–35.7)
MCV RBC AUTO: 84.5 FL (ref 79–97)
MONOCYTES # BLD AUTO: 0.56 10*3/MM3 (ref 0.1–0.9)
MONOCYTES NFR BLD AUTO: 10.8 % (ref 5–12)
NEUTROPHILS # BLD AUTO: 2.86 10*3/MM3 (ref 1.7–7)
NEUTROPHILS NFR BLD AUTO: 55.3 % (ref 42.7–76)
NRBC BLD AUTO-RTO: 0 /100 WBC (ref 0–0.2)
PLATELET # BLD AUTO: 254 10*3/MM3 (ref 140–450)
PMV BLD AUTO: 10.9 FL (ref 6–12)
RBC # BLD AUTO: 4.83 10*6/MM3 (ref 3.77–5.28)
WBC NRBC COR # BLD: 5.17 10*3/MM3 (ref 3.4–10.8)

## 2020-01-13 PROCEDURE — 36415 COLL VENOUS BLD VENIPUNCTURE: CPT

## 2020-01-13 PROCEDURE — 80053 COMPREHEN METABOLIC PANEL: CPT

## 2020-01-13 PROCEDURE — 85025 COMPLETE CBC W/AUTO DIFF WBC: CPT

## 2020-01-13 PROCEDURE — 85652 RBC SED RATE AUTOMATED: CPT

## 2020-01-13 PROCEDURE — 86480 TB TEST CELL IMMUN MEASURE: CPT

## 2020-01-13 PROCEDURE — 86140 C-REACTIVE PROTEIN: CPT

## 2020-01-14 LAB
ALBUMIN SERPL-MCNC: 4.5 G/DL (ref 3.5–5.2)
ALBUMIN/GLOB SERPL: 1.6 G/DL
ALP SERPL-CCNC: 64 U/L (ref 39–117)
ALT SERPL W P-5'-P-CCNC: 27 U/L (ref 1–33)
ANION GAP SERPL CALCULATED.3IONS-SCNC: 15.2 MMOL/L (ref 5–15)
AST SERPL-CCNC: 21 U/L (ref 1–32)
BILIRUB SERPL-MCNC: 0.2 MG/DL (ref 0.2–1.2)
BUN BLD-MCNC: 9 MG/DL (ref 6–20)
BUN/CREAT SERPL: 16.1 (ref 7–25)
CALCIUM SPEC-SCNC: 9.6 MG/DL (ref 8.6–10.5)
CHLORIDE SERPL-SCNC: 97 MMOL/L (ref 98–107)
CO2 SERPL-SCNC: 22.8 MMOL/L (ref 22–29)
CREAT BLD-MCNC: 0.56 MG/DL (ref 0.57–1)
ERYTHROCYTE [SEDIMENTATION RATE] IN BLOOD: 4 MM/HR (ref 0–20)
GFR SERPL CREATININE-BSD FRML MDRD: 133 ML/MIN/1.73
GLOBULIN UR ELPH-MCNC: 2.9 GM/DL
GLUCOSE BLD-MCNC: 84 MG/DL (ref 65–99)
POTASSIUM BLD-SCNC: 4.4 MMOL/L (ref 3.5–5.2)
PROT SERPL-MCNC: 7.4 G/DL (ref 6–8.5)
SODIUM BLD-SCNC: 135 MMOL/L (ref 136–145)

## 2020-01-16 LAB
QUANTIFERON CRITERIA: NORMAL
QUANTIFERON MITOGEN VALUE: >10 IU/ML
QUANTIFERON NIL VALUE: 0.02 IU/ML
QUANTIFERON TB1 AG VALUE: 0.02 IU/ML
QUANTIFERON TB2 AG VALUE: 0.01 IU/ML
QUANTIFERON-TB GOLD PLUS: NEGATIVE